# Patient Record
Sex: MALE | Race: WHITE | URBAN - METROPOLITAN AREA
[De-identification: names, ages, dates, MRNs, and addresses within clinical notes are randomized per-mention and may not be internally consistent; named-entity substitution may affect disease eponyms.]

---
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
## 2022-04-20 ENCOUNTER — APPOINTMENT (OUTPATIENT)
Dept: CT IMAGING | Age: 22
End: 2022-04-20
Payer: COMMERCIAL

## 2022-04-20 ENCOUNTER — HOSPITAL ENCOUNTER (EMERGENCY)
Age: 22
Discharge: HOME | End: 2022-04-20
Attending: EMERGENCY MEDICINE
Payer: COMMERCIAL

## 2022-04-20 VITALS
RESPIRATION RATE: 12 BRPM | TEMPERATURE: 97.8 F | OXYGEN SATURATION: 96 % | DIASTOLIC BLOOD PRESSURE: 67 MMHG | WEIGHT: 135 LBS | HEART RATE: 63 BPM | SYSTOLIC BLOOD PRESSURE: 113 MMHG | HEIGHT: 70 IN | BODY MASS INDEX: 19.33 KG/M2

## 2022-04-20 DIAGNOSIS — R82.5 POSITIVE URINE DRUG SCREEN: ICD-10-CM

## 2022-04-20 DIAGNOSIS — R10.11 RIGHT UPPER QUADRANT ABDOMINAL PAIN: Primary | ICD-10-CM

## 2022-04-20 LAB
A/G RATIO: 2.2 (ref 1.1–2.2)
ALBUMIN SERPL-MCNC: 5.6 G/DL (ref 3.4–5)
ALP BLD-CCNC: 69 U/L (ref 40–129)
ALT SERPL-CCNC: 15 U/L (ref 10–40)
AMPHETAMINE SCREEN, URINE: ABNORMAL
ANION GAP SERPL CALCULATED.3IONS-SCNC: 12 MMOL/L (ref 3–16)
AST SERPL-CCNC: 18 U/L (ref 15–37)
BARBITURATE SCREEN URINE: ABNORMAL
BASOPHILS ABSOLUTE: 0 K/UL (ref 0–0.2)
BASOPHILS RELATIVE PERCENT: 0.3 %
BENZODIAZEPINE SCREEN, URINE: ABNORMAL
BILIRUB SERPL-MCNC: 0.8 MG/DL (ref 0–1)
BILIRUBIN URINE: NEGATIVE
BLOOD, URINE: NEGATIVE
BUN BLDV-MCNC: 9 MG/DL (ref 7–20)
CALCIUM SERPL-MCNC: 10.5 MG/DL (ref 8.3–10.6)
CANNABINOID SCREEN URINE: POSITIVE
CHLORIDE BLD-SCNC: 101 MMOL/L (ref 99–110)
CLARITY: CLEAR
CO2: 26 MMOL/L (ref 21–32)
COCAINE METABOLITE SCREEN URINE: ABNORMAL
COLOR: YELLOW
CREAT SERPL-MCNC: 0.9 MG/DL (ref 0.9–1.3)
EOSINOPHILS ABSOLUTE: 0 K/UL (ref 0–0.6)
EOSINOPHILS RELATIVE PERCENT: 0.2 %
GFR AFRICAN AMERICAN: >60
GFR NON-AFRICAN AMERICAN: >60
GLUCOSE BLD-MCNC: 110 MG/DL (ref 70–99)
GLUCOSE URINE: NEGATIVE MG/DL
HCT VFR BLD CALC: 48.9 % (ref 40.5–52.5)
HEMOGLOBIN: 16.6 G/DL (ref 13.5–17.5)
KETONES, URINE: NEGATIVE MG/DL
LEUKOCYTE ESTERASE, URINE: NEGATIVE
LIPASE: 25 U/L (ref 13–60)
LYMPHOCYTES ABSOLUTE: 1.8 K/UL (ref 1–5.1)
LYMPHOCYTES RELATIVE PERCENT: 25.3 %
Lab: ABNORMAL
MCH RBC QN AUTO: 31.1 PG (ref 26–34)
MCHC RBC AUTO-ENTMCNC: 33.8 G/DL (ref 31–36)
MCV RBC AUTO: 91.8 FL (ref 80–100)
METHADONE SCREEN, URINE: ABNORMAL
MICROSCOPIC EXAMINATION: NORMAL
MONOCYTES ABSOLUTE: 0.4 K/UL (ref 0–1.3)
MONOCYTES RELATIVE PERCENT: 6.1 %
NEUTROPHILS ABSOLUTE: 4.8 K/UL (ref 1.7–7.7)
NEUTROPHILS RELATIVE PERCENT: 68.1 %
NITRITE, URINE: NEGATIVE
OPIATE SCREEN URINE: ABNORMAL
OXYCODONE URINE: ABNORMAL
PDW BLD-RTO: 12.6 % (ref 12.4–15.4)
PH UA: 6.5
PH UA: 6.5 (ref 5–8)
PHENCYCLIDINE SCREEN URINE: POSITIVE
PLATELET # BLD: 258 K/UL (ref 135–450)
PMV BLD AUTO: 7.4 FL (ref 5–10.5)
POTASSIUM REFLEX MAGNESIUM: 3.9 MMOL/L (ref 3.5–5.1)
PROPOXYPHENE SCREEN: ABNORMAL
PROTEIN UA: NEGATIVE MG/DL
RBC # BLD: 5.33 M/UL (ref 4.2–5.9)
SODIUM BLD-SCNC: 139 MMOL/L (ref 136–145)
SPECIFIC GRAVITY UA: 1.02 (ref 1–1.03)
TOTAL PROTEIN: 8.1 G/DL (ref 6.4–8.2)
URINE REFLEX TO CULTURE: NORMAL
URINE TYPE: NORMAL
UROBILINOGEN, URINE: 0.2 E.U./DL
WBC # BLD: 7.1 K/UL (ref 4–11)

## 2022-04-20 PROCEDURE — 6360000004 HC RX CONTRAST MEDICATION: Performed by: EMERGENCY MEDICINE

## 2022-04-20 PROCEDURE — 96374 THER/PROPH/DIAG INJ IV PUSH: CPT

## 2022-04-20 PROCEDURE — 74177 CT ABD & PELVIS W/CONTRAST: CPT

## 2022-04-20 PROCEDURE — 2580000003 HC RX 258: Performed by: EMERGENCY MEDICINE

## 2022-04-20 PROCEDURE — 83690 ASSAY OF LIPASE: CPT

## 2022-04-20 PROCEDURE — 80307 DRUG TEST PRSMV CHEM ANLYZR: CPT

## 2022-04-20 PROCEDURE — 99285 EMERGENCY DEPT VISIT HI MDM: CPT

## 2022-04-20 PROCEDURE — 6360000002 HC RX W HCPCS: Performed by: EMERGENCY MEDICINE

## 2022-04-20 PROCEDURE — 81003 URINALYSIS AUTO W/O SCOPE: CPT

## 2022-04-20 PROCEDURE — 80053 COMPREHEN METABOLIC PANEL: CPT

## 2022-04-20 PROCEDURE — 85025 COMPLETE CBC W/AUTO DIFF WBC: CPT

## 2022-04-20 RX ORDER — 0.9 % SODIUM CHLORIDE 0.9 %
1000 INTRAVENOUS SOLUTION INTRAVENOUS ONCE
Status: COMPLETED | OUTPATIENT
Start: 2022-04-20 | End: 2022-04-20

## 2022-04-20 RX ORDER — KETOROLAC TROMETHAMINE 30 MG/ML
15 INJECTION, SOLUTION INTRAMUSCULAR; INTRAVENOUS ONCE
Status: COMPLETED | OUTPATIENT
Start: 2022-04-20 | End: 2022-04-20

## 2022-04-20 RX ADMIN — KETOROLAC TROMETHAMINE 15 MG: 30 INJECTION, SOLUTION INTRAMUSCULAR at 14:30

## 2022-04-20 RX ADMIN — SODIUM CHLORIDE 1000 ML: 9 INJECTION, SOLUTION INTRAVENOUS at 14:29

## 2022-04-20 RX ADMIN — IOPAMIDOL 75 ML: 755 INJECTION, SOLUTION INTRAVENOUS at 15:32

## 2022-04-20 ASSESSMENT — PAIN DESCRIPTION - ORIENTATION: ORIENTATION: RIGHT;UPPER

## 2022-04-20 ASSESSMENT — PAIN SCALES - GENERAL
PAINLEVEL_OUTOF10: 5
PAINLEVEL_OUTOF10: 3

## 2022-04-20 ASSESSMENT — PAIN - FUNCTIONAL ASSESSMENT: PAIN_FUNCTIONAL_ASSESSMENT: 0-10

## 2022-04-20 ASSESSMENT — PAIN DESCRIPTION - FREQUENCY: FREQUENCY: CONTINUOUS

## 2022-04-20 ASSESSMENT — PAIN DESCRIPTION - DESCRIPTORS: DESCRIPTORS: ACHING

## 2022-04-20 ASSESSMENT — PAIN DESCRIPTION - LOCATION: LOCATION: ABDOMEN

## 2022-04-20 NOTE — Clinical Note
Mariama Brooks was seen and treated in our emergency department on 4/20/2022. He may return to work on 04/21/2022. If you have any questions or concerns, please don't hesitate to call.       Sonja Stover MD

## 2022-04-20 NOTE — ED PROVIDER NOTES
CHIEF COMPLAINT  Abdominal Pain (right flank pain since this am, \"turn funny and felt numerous pops\")      HISTORY OF PRESENT ILLNESS  Cecilia Jansen is a 25 y.o. male with a history of mood disorder, drug abuse, cholecystectomy roughly 4 years ago who presents to the ED complaining of abdominal pain and flank pain. Patient reports acute onset of right upper quadrant and right flank pain which began while taking a shower around 10:30 AM.  Pain is 6 out of 10, aching, constant. States it worsens with movement and when going over bumps in the car. Has not taken anything prior to arrival.  He denies fever, chills, nausea, vomiting, diarrhea, constipation, dysuria, hematuria, hematochezia, melena. No report of recent illness or trauma. No other complaints, modifying factors or associated symptoms. I have reviewed the following from the nursing documentation. No past medical history on file. Past Surgical History:   Procedure Laterality Date    CHOLECYSTECTOMY      TONSILLECTOMY       No family history on file.   Social History     Socioeconomic History    Marital status: Unknown     Spouse name: Not on file    Number of children: Not on file    Years of education: Not on file    Highest education level: Not on file   Occupational History    Not on file   Tobacco Use    Smoking status: Never Smoker    Smokeless tobacco: Never Used   Vaping Use    Vaping Use: Every day    Substances: Always   Substance and Sexual Activity    Alcohol use: Not Currently     Comment: holidays; occassional    Drug use: Yes     Types: Marijuana Porfirio Relic)     Comment: acid    Sexual activity: Not on file   Other Topics Concern    Not on file   Social History Narrative    Not on file     Social Determinants of Health     Financial Resource Strain:     Difficulty of Paying Living Expenses: Not on file   Food Insecurity:     Worried About Running Out of Food in the Last Year: Not on file    [address] in the Last Year: Not on file   Transportation Needs:     Lack of Transportation (Medical): Not on file    Lack of Transportation (Non-Medical): Not on file   Physical Activity:     Days of Exercise per Week: Not on file    Minutes of Exercise per Session: Not on file   Stress:     Feeling of Stress : Not on file   Social Connections:     Frequency of Communication with Friends and Family: Not on file    Frequency of Social Gatherings with Friends and Family: Not on file    Attends Jainism Services: Not on file    Active Member of [address] [place] [address] [employer] or Organizations: Not on file    Attends Club or Organization Meetings: Not on file    Marital Status: Not on file   Intimate Partner Violence:     Fear of Current or Ex-Partner: Not on file    Emotionally Abused: Not on file    Physically Abused: Not on file    Sexually Abused: Not on file   Housing Stability:     Unable to Pay for Housing in the Last Year: Not on file    Number of [place] in the Last Year: Not on file    Unstable Housing in the Last Year: Not on file     No current facility-administered medications for this encounter. Current Outpatient Medications   Medication Sig Dispense Refill    lamoTRIgine (LAMICTAL) 100 MG tablet Take 100 mg by mouth daily       No Known Allergies    REVIEW OF SYSTEMS  10 systems reviewed, pertinent positives per HPI otherwise noted to be negative. PHYSICAL EXAM  /74   Pulse 74   Temp 98.2 °F (36.8 °C)   Resp 12   Ht 5' 10\" (1.778 m)   Wt 135 lb (61.2 kg)   SpO2 100%   BMI 19.37 kg/m²   GENERAL APPEARANCE: Awake and alert. Cooperative. No acute distress. HEAD: Normocephalic. Atraumatic. EYES: PERRL. EOM's grossly intact. ENT: Mucous membranes are moist.   NECK: Supple, trachea midline. HEART: RRR. Normal S1, S2. No murmurs, rubs or gallops. LUNGS: Respirations unlabored. CTAB. Good air exchange. No wheezes, rales, or rhonchi. Speaking comfortably in full sentences. ABDOMEN: Soft. Non-distended. Mild epigastric and RUQ tenderness. No guarding or rebound. Normal Bowel sounds. EXTREMITIES: No peripheral edema. MAEE. No acute deformities. SKIN: Warm and dry. No acute rashes. NEUROLOGICAL: Alert and oriented X 3. CN II-XII intact. No gross facial drooping. Strength 5/5, sensation intact. No pronator drift. Normal coordination. PSYCHIATRIC: Normal mood and affect. LABS  I have reviewed all labs for this visit.    Results for orders placed or performed during the hospital encounter of 04/20/22   CBC with Auto Differential   Result Value Ref Range    WBC 7.1 4.0 - 11.0 K/uL    RBC 5.33 4.20 - 5.90 M/uL    Hemoglobin 16.6 13.5 - 17.5 g/dL    Hematocrit 48.9 40.5 - 52.5 %    MCV 91.8 80.0 - 100.0 fL    MCH 31.1 26.0 - 34.0 pg    MCHC 33.8 31.0 - 36.0 g/dL    RDW 12.6 12.4 - 15.4 %    Platelets 455 916 - 221 K/uL    MPV 7.4 5.0 - 10.5 fL    Neutrophils % 68.1 %    Lymphocytes % 25.3 %    Monocytes % 6.1 %    Eosinophils % 0.2 %    Basophils % 0.3 %    Neutrophils Absolute 4.8 1.7 - 7.7 K/uL    Lymphocytes Absolute 1.8 1.0 - 5.1 K/uL    Monocytes Absolute 0.4 0.0 - 1.3 K/uL    Eosinophils Absolute 0.0 0.0 - 0.6 K/uL    Basophils Absolute 0.0 0.0 - 0.2 K/uL   Comprehensive Metabolic Panel w/ Reflex to MG   Result Value Ref Range    Sodium 139 136 - 145 mmol/L    Potassium reflex Magnesium 3.9 3.5 - 5.1 mmol/L    Chloride 101 99 - 110 mmol/L    CO2 26 21 - 32 mmol/L    Anion Gap 12 3 - 16    Glucose 110 (H) 70 - 99 mg/dL    BUN 9 7 - 20 mg/dL    CREATININE 0.9 0.9 - 1.3 mg/dL    GFR Non-African American >60 >60    GFR African American >60 >60    Calcium 10.5 8.3 - 10.6 mg/dL    Total Protein 8.1 6.4 - 8.2 g/dL    Albumin 5.6 (H) 3.4 - 5.0 g/dL    Albumin/Globulin Ratio 2.2 1.1 - 2.2    Total Bilirubin 0.8 0.0 - 1.0 mg/dL    Alkaline Phosphatase 69 40 - 129 U/L    ALT 15 10 - 40 U/L    AST 18 15 - 37 U/L   Lipase   Result Value Ref Range    Lipase 25.0 13.0 - 60.0 U/L   Urine Drug Screen   Result Value Ref Range    Amphetamine Screen, Urine Neg Negative <1000ng/mL    Barbiturate Screen, Ur Neg Negative <200 ng/mL    Benzodiazepine Screen, Urine Neg Negative <200 ng/mL    Cannabinoid Scrn, Ur POSITIVE (A) Negative <50 ng/mL    Cocaine Metabolite Screen, Urine Neg Negative <300 ng/mL    Opiate Scrn, Ur Neg Negative <300 ng/mL    PCP Screen, Urine POSITIVE (A) Negative <25 ng/mL    Methadone Screen, Urine Neg Negative <300 ng/mL    Propoxyphene Scrn, Ur Neg Negative <300 ng/mL    Oxycodone Urine Neg Negative <100 ng/ml    pH, UA 6.5     Drug Screen Comment: see below    Urinalysis with Reflex to Culture   Result Value Ref Range    Color, UA Yellow Straw/Yellow    Clarity, UA Clear Clear    Glucose, Ur Negative Negative mg/dL    Bilirubin Urine Negative Negative    Ketones, Urine Negative Negative mg/dL    Specific Gravity, UA 1.025 1.005 - 1.030    Blood, Urine Negative Negative    pH, UA 6.5 5.0 - 8.0    Protein, UA Negative Negative mg/dL    Urobilinogen, Urine 0.2 <2.0 E.U./dL    Nitrite, Urine Negative Negative    Leukocyte Esterase, Urine Negative Negative    Microscopic Examination Not Indicated     Urine Type NotGiven     Urine Reflex to Culture Not Indicated          RADIOLOGY  X-RAYS:  I have reviewed radiologic plain film image(s). ALL OTHER NON-PLAIN FILM IMAGES SUCH AS CT, ULTRASOUND AND MRI HAVE BEEN READ BY THE RADIOLOGIST. CT ABDOMEN PELVIS W IV CONTRAST Additional Contrast? None   Final Result   1. Mild dilation of the proximal jejunum, suggestive of enteritis. 2. Small amount of free fluid in the cul-de-sac, nonspecific. 3. Periportal low-attenuation of the liver. This is nonspecific and may be   secondary to hydration status. Hepatitis is another consideration. Correlation with liver function tests is suggested. Rechecks: Physical assessment performed. Resting comfortably, states he is hungry. ED COURSE/MDM  Patient seen and evaluated. Old records reviewed. Labs and imaging reviewed and results discussed with patient. Patient with nonspecific right upper quadrant abdominal discomfort. Physical exam, labs, imaging unremarkable aside from some mild tenderness however patient is completely well-appearing. No fevers, leukocytosis, emesis, melena, hematochezia, urinary symptoms. Plan for close outpatient follow-up versus return with any concerns. New Prescriptions    No medications on file       CLINICAL IMPRESSION  1. Right upper quadrant abdominal pain    2. Positive urine drug screen        Blood pressure 115/74, pulse 74, temperature 98.2 °F (36.8 °C), resp. rate 12, height 5' 10\" (1.778 m), weight 135 lb (61.2 kg), SpO2 100 %. DISPOSITION  Librado Armando was discharged to home in stable condition.         Inge Lombard, MD  04/20/22 [id number]

## 2022-06-17 ENCOUNTER — APPOINTMENT (OUTPATIENT)
Dept: GENERAL RADIOLOGY | Age: 22
End: 2022-06-17
Payer: COMMERCIAL

## 2022-06-17 ENCOUNTER — HOSPITAL ENCOUNTER (EMERGENCY)
Age: 22
Discharge: HOME | End: 2022-06-17
Attending: EMERGENCY MEDICINE
Payer: COMMERCIAL

## 2022-06-17 VITALS
HEIGHT: 70 IN | TEMPERATURE: 97.9 F | DIASTOLIC BLOOD PRESSURE: 81 MMHG | BODY MASS INDEX: 18.61 KG/M2 | RESPIRATION RATE: 20 BRPM | WEIGHT: 130 LBS | OXYGEN SATURATION: 98 % | SYSTOLIC BLOOD PRESSURE: 117 MMHG | HEART RATE: 90 BPM

## 2022-06-17 DIAGNOSIS — S43.402A SPRAIN OF LEFT SHOULDER, UNSPECIFIED SHOULDER SPRAIN TYPE, INITIAL ENCOUNTER: Primary | ICD-10-CM

## 2022-06-17 PROCEDURE — 99283 EMERGENCY DEPT VISIT LOW MDM: CPT

## 2022-06-17 PROCEDURE — 73030 X-RAY EXAM OF SHOULDER: CPT

## 2022-06-17 RX ORDER — IBUPROFEN 800 MG/1
800 TABLET ORAL 3 TIMES DAILY PRN
Qty: 90 TABLET | Refills: 0 | Status: SHIPPED | OUTPATIENT
Start: 2022-06-17 | End: 2022-07-17

## 2022-06-17 RX ORDER — ACETAMINOPHEN 500 MG
500 TABLET ORAL EVERY 6 HOURS PRN
Qty: 120 TABLET | Refills: 0 | Status: SHIPPED | OUTPATIENT
Start: 2022-06-17 | End: 2022-07-17

## 2022-06-17 ASSESSMENT — PAIN DESCRIPTION - LOCATION: LOCATION: SHOULDER

## 2022-06-17 ASSESSMENT — PAIN - FUNCTIONAL ASSESSMENT: PAIN_FUNCTIONAL_ASSESSMENT: 0-10

## 2022-06-17 ASSESSMENT — PAIN DESCRIPTION - ORIENTATION: ORIENTATION: LEFT

## 2022-06-17 ASSESSMENT — PAIN SCALES - GENERAL: PAINLEVEL_OUTOF10: 8

## 2022-06-18 NOTE — ED PROVIDER NOTES
I independently performed a history and physical on [facility]. All diagnostic, treatment, and disposition decisions were made by myself in conjunction with the advanced practice provider. For further details of [facility] emergency department encounter, please see ROBERTA Celaya's documentation. Patient reports that he sustained a direct injury to the left shoulder while playing basketball. His shoulder impacted another player's hip. He felt something pop in his shoulder. He has pain with range of motion. On exam he is tender at the [facility] joint but no tenderness over the scapular body. He is neurovascular intact to the fingertips. XR SHOULDER LEFT (MIN 2 VIEWS)    Result Date: 6/17/2022  EXAMINATION: THREE XRAY VIEWS OF THE LEFT SHOULDER 6/17/2022 7:50 pm COMPARISON: None. HISTORY: ORDERING SYSTEM PROVIDED HISTORY: injury TECHNOLOGIST PROVIDED HISTORY: Reason for exam:->injury Initial encounter. FINDINGS: Linear lucency is noted within the scapular body and is of uncertain etiology. Otherwise, no acute fracture or dislocation. Joint spaces and alignment are maintained. Soft tissues are unremarkable. The visualized lung is clear. Linear lucency is noted in the scapular body on the frontal views and is of uncertain etiology. A nondisplaced fracture cannot be excluded given history of trauma. Otherwise no acute osseous abnormality. I personally saw this patient and performed a substantive portion of the visit including all aspects of the medical decision making. MDM  I do not believe that the patient has any nondisplaced fracture of the scapula. I do believe this is likely [facility] sprain/grade 1 separation. For this reason we will treat with sling and NSAIDs and follow-up with orthopedics.        Hector Barker MD  06/17/22 0751

## 2022-06-18 NOTE — ED PROVIDER NOTES
[facility] Emergency Department    CHIEF COMPLAINT  Shoulder Pain (playing basketball, ran into someone's hip \"really hard and heard a pop\"; patient reports pain 8/10)      SHARED SERVICE VISIT  I have seen and evaluated this patient with my supervising physician, Dr. Jeannie Meza. HISTORY OF PRESENT ILLNESS  Silviano Serrano is a 25 y.o. male who presents to the ED complaining of left shoulder pain. Patient states that he was playing basketball and ran his left shoulder into another player's hip and heard an audible pop. Patient states pain began shortly after collision. Patient states that moving his arm increases pain. Patient denies any numbness or tingling in the left extremity. Patient denies any headaches, dizziness, or loss of consciousness. Patient denies hitting his head. Patient denies any fevers, chills, or body aches. Patient denies any nausea or vomiting. Patient denies any coughing, sneezing coughing or sore throat. Patient denies any abdominal pain. Patient denies any urinary symptoms. Patient denies any recent sick contacts or travel. No other complaints, modifying factors or associated symptoms. Nursing notes reviewed. History reviewed. No pertinent past medical history. Past Surgical History:   Procedure Laterality Date    CHOLECYSTECTOMY      TONSILLECTOMY       History reviewed. No pertinent family history.   Social History     Socioeconomic History    Marital status: Unknown     Spouse name: Not on file    Number of children: Not on file    Years of education: Not on file    Highest education level: Not on file   Occupational History    Not on file   Tobacco Use    Smoking status: Never Smoker    Smokeless tobacco: Never Used   Vaping Use    Vaping Use: Every day    Substances: Always   Substance and Sexual Activity    Alcohol use: Not Currently     Comment: holidays; occassional    Drug use: Yes     Types: Marijuana Marybel [place]) Comment: acid    Sexual activity: Not on file   Other Topics Concern    Not on file   Social History Narrative    Not on file     Social Determinants of Health     Financial Resource Strain:     Difficulty of Paying Living Expenses: Not on file   Food Insecurity:     Worried About Running Out of Food in the Last Year: Not on file    Reilly of Food in the Last Year: Not on file   Transportation Needs:     Lack of Transportation (Medical): Not on file    Lack of Transportation (Non-Medical): Not on file   Physical Activity:     Days of Exercise per Week: Not on file    Minutes of Exercise per Session: Not on file   Stress:     Feeling of Stress : Not on file   Social Connections:     Frequency of Communication with Friends and Family: Not on file    Frequency of Social Gatherings with Friends and Family: Not on file    Attends [employer] Services: Not on file    Active Member of [address] [place] [address] [employer] or Organizations: Not on file    Attends Club or Organization Meetings: Not on file    Marital Status: Not on file   Intimate Partner Violence:     Fear of Current or Ex-Partner: Not on file    Emotionally Abused: Not on file    Physically Abused: Not on file    Sexually Abused: Not on file   Housing Stability:     Unable to Pay for Housing in the Last Year: Not on file    Number of [place] in the Last Year: Not on file    Unstable Housing in the Last Year: Not on file     No current facility-administered medications for this encounter.      Current Outpatient Medications   Medication Sig Dispense Refill    acetaminophen (TYLENOL) 500 MG tablet Take 1 tablet by mouth every 6 hours as needed for Pain 120 tablet 0    ibuprofen (ADVIL;MOTRIN) 800 MG tablet Take 1 tablet by mouth 3 times daily as needed for Pain 90 tablet 0    lamoTRIgine (LAMICTAL) 100 MG tablet Take 100 mg by mouth daily       No Known Allergies    REVIEW OF SYSTEMS  10 systems reviewed, pertinent positives per HPI otherwise noted to be negative    PHYSICAL EXAM  /81   Pulse 90   Temp 97.9 °F (36.6 °C)   Resp 20   Ht 5' 10\" (1.778 m)   Wt 130 lb (59 kg)   SpO2 98%   BMI 18.65 kg/m²   GENERAL APPEARANCE: Awake and alert. Cooperative. No acute distress. HEAD: Normocephalic. Atraumatic. EYES: PERRL. EOM's grossly intact. ENT: Mucous membranes are moist.   NECK: Supple. HEART: RRR. No murmurs. LUNGS: Respirations unlabored. CTAB. Good air exchange. Speaking comfortably in full sentences. ABDOMEN: Soft. Non-distended. Non-tender. No guarding or rebound. No masses. No organomegaly. EXTREMITIES: Tenderness to palpation of the trapezius muscle as well as AC joint. No visible or palpable deformities noted no areas of erythema or edema. Full range of motion passively with slight decrease in range of motion actively. Strength 4/5 of the left arm. No numbness or tingling radiating down the arm. Strength intact. No peripheral edema. Moves all extremities equally. All extremities neurovascularly intact. SKIN: Warm and dry. No acute rashes. NEUROLOGICAL: Alert and oriented. CN's 2-12 intact. No gross facial drooping. Strength 5/5, sensation intact. PSYCHIATRIC: Normal mood and affect. RADIOLOGY  XR SHOULDER LEFT (MIN 2 VIEWS)    Result Date: 6/17/2022  EXAMINATION: THREE XRAY VIEWS OF THE LEFT SHOULDER 6/17/2022 7:50 pm COMPARISON: None. HISTORY: ORDERING SYSTEM PROVIDED HISTORY: injury TECHNOLOGIST PROVIDED HISTORY: Reason for exam:->injury Initial encounter. FINDINGS: Linear lucency is noted within the scapular body and is of uncertain etiology. Otherwise, no acute fracture or dislocation. Joint spaces and alignment are maintained. Soft tissues are unremarkable. The visualized lung is clear. Linear lucency is noted in the scapular body on the frontal views and is of uncertain etiology. A nondisplaced fracture cannot be excluded given history of trauma. Otherwise no acute osseous abnormality.        ED COURSE  65-year-old male presents to the ED complaining of left shoulder pain. Patient suffered inclusively pain basketball when the pain began. Tenderness palpation of the [facility] joint as well as the trapezius muscle on the left arm. No palpable or visual deformities. Neurovascular status intact of the left upper extremity. Left shoulder x-ray performed in the ED shows a linear lucency in the scapular body on frontal view with uncertain otology. Nondisplaced fracture cannot be excluded given history of trauma. Patient is experiencing no pain over the scapula with no history of trauma that would fracture scapula. Triage vitals /81, pulse 90, respirations 20, temperature 97.9 °F, SPO2 98% on room air. Risk management discussed and shared decision making had with patient and/or surrogate. All questions were answered. Patient will follow up with primary care provider or orthopedics for further evaluation/treatment. All questions answered. Patient will return to ED for new/worsening symptoms. Patient was sent home with a prescription for Tylenol and Motrin as needed for pain    CRITICAL CARE TIME  0 minutes of critical care time spent not including separately billable procedures. MDM  No results found for this visit on 06/17/22. I estimate there is LOW risk for COMPARTMENT SYNDROME, DEEP VENOUS THROMBOSIS, SEPTIC ARTHRITIS, TENDON OR NEUROVASCULAR INJURY, thus I consider the discharge disposition reasonable. Woo Mosqueda and I have discussed the diagnosis and risks, and we agree with discharging home to follow-up with their primary doctor or the referral orthopedist. We also discussed returning to the Emergency Department immediately if new or worsening symptoms occur. We have discussed the symptoms which are most concerning (e.g., changing or worsening pain, numbness, weakness) that necessitate immediate return. Final Impression    1.  Sprain of left shoulder, unspecified shoulder sprain type, initial encounter        Blood pressure 117/81, pulse 90, temperature 97.9 °F (36.6 °C), resp. rate 20, height 5' 10\" (1.778 m), weight 130 lb (59 kg), SpO2 98 %. DISPOSITION  Patient was discharged to home in good condition.          Christiana Baker PA-C  06/17/22 8695

## 2022-06-18 NOTE — ED NOTES
Discharge instructions explained by ED provider. Patient verbalized understanding and denies any other concerns or complaints at this time. Patient vital signs stable and no acute signs or symptoms of distress noted at discharge. Patient deemed clinically stable. Patient d/c home with friend.      Sheryle Pepper, RN  06/17/22 4866

## 2023-08-04 ENCOUNTER — HOSPITAL ENCOUNTER (EMERGENCY)
Age: 23
Discharge: HOME | End: 2023-08-04
Attending: EMERGENCY MEDICINE

## 2023-08-04 ENCOUNTER — APPOINTMENT (OUTPATIENT)
Dept: GENERAL RADIOLOGY | Age: 23
End: 2023-08-04

## 2023-08-04 VITALS
TEMPERATURE: 98.2 F | WEIGHT: 130 LBS | BODY MASS INDEX: 18.61 KG/M2 | RESPIRATION RATE: 14 BRPM | OXYGEN SATURATION: 99 % | HEIGHT: 70 IN | DIASTOLIC BLOOD PRESSURE: 66 MMHG | SYSTOLIC BLOOD PRESSURE: 105 MMHG | HEART RATE: 49 BPM

## 2023-08-04 DIAGNOSIS — R06.02 SHORTNESS OF BREATH: ICD-10-CM

## 2023-08-04 DIAGNOSIS — R51.9 NONINTRACTABLE EPISODIC HEADACHE, UNSPECIFIED HEADACHE TYPE: Primary | ICD-10-CM

## 2023-08-04 LAB
ANION GAP SERPL CALCULATED.3IONS-SCNC: 9 MMOL/L (ref 3–16)
BASE EXCESS BLDV CALC-SCNC: 1.4 MMOL/L (ref -3–3)
BASOPHILS # BLD: 0 K/UL (ref 0–0.2)
BASOPHILS NFR BLD: 0.5 %
BUN SERPL-MCNC: 16 MG/DL (ref 7–20)
CALCIUM SERPL-MCNC: 9.9 MG/DL (ref 8.3–10.6)
CHLORIDE SERPL-SCNC: 105 MMOL/L (ref 99–110)
CO2 BLDV-SCNC: 25 MMOL/L
CO2 SERPL-SCNC: 25 MMOL/L (ref 21–32)
COHGB MFR BLDV: 2.2 % (ref 0–1.5)
CREAT SERPL-MCNC: 0.9 MG/DL (ref 0.9–1.3)
DEPRECATED RDW RBC AUTO: 12.9 % (ref 12.4–15.4)
EKG ATRIAL RATE: 50 BPM
EKG DIAGNOSIS: NORMAL
EKG P AXIS: 43 DEGREES
EKG P-R INTERVAL: 148 MS
EKG Q-T INTERVAL: 398 MS
EKG QRS DURATION: 92 MS
EKG QTC CALCULATION (BAZETT): 362 MS
EKG R AXIS: 62 DEGREES
EKG T AXIS: 52 DEGREES
EKG VENTRICULAR RATE: 50 BPM
EOSINOPHIL # BLD: 0 K/UL (ref 0–0.6)
EOSINOPHIL NFR BLD: 0.8 %
GFR SERPLBLD CREATININE-BSD FMLA CKD-EPI: >60 ML/MIN/{1.73_M2}
GLUCOSE SERPL-MCNC: 87 MG/DL (ref 70–99)
HCO3 BLDV-SCNC: 24.2 MMOL/L (ref 23–29)
HCT VFR BLD AUTO: 40 % (ref 40.5–52.5)
HGB BLD-MCNC: 13.9 G/DL (ref 13.5–17.5)
LYMPHOCYTES # BLD: 1.8 K/UL (ref 1–5.1)
LYMPHOCYTES NFR BLD: 31.5 %
MCH RBC QN AUTO: 31.1 PG (ref 26–34)
MCHC RBC AUTO-ENTMCNC: 34.7 G/DL (ref 31–36)
MCV RBC AUTO: 89.8 FL (ref 80–100)
METHGB MFR BLDV: 0.5 %
MONOCYTES # BLD: 0.4 K/UL (ref 0–1.3)
MONOCYTES NFR BLD: 7.7 %
NEUTROPHILS # BLD: 3.4 K/UL (ref 1.7–7.7)
NEUTROPHILS NFR BLD: 59.5 %
O2 THERAPY: ABNORMAL
PCO2 BLDV: 33.1 MMHG (ref 40–50)
PH BLDV: 7.48 [PH] (ref 7.35–7.45)
PLATELET # BLD AUTO: 199 K/UL (ref 135–450)
PMV BLD AUTO: 8.1 FL (ref 5–10.5)
PO2 BLDV: 127.2 MMHG (ref 25–40)
POTASSIUM SERPL-SCNC: 4.3 MMOL/L (ref 3.5–5.1)
RBC # BLD AUTO: 4.46 M/UL (ref 4.2–5.9)
SAO2 % BLDV: 98 %
SODIUM SERPL-SCNC: 139 MMOL/L (ref 136–145)
WBC # BLD AUTO: 5.8 K/UL (ref 4–11)

## 2023-08-04 PROCEDURE — 99285 EMERGENCY DEPT VISIT HI MDM: CPT

## 2023-08-04 PROCEDURE — 93005 ELECTROCARDIOGRAM TRACING: CPT | Performed by: EMERGENCY MEDICINE

## 2023-08-04 PROCEDURE — 93010 ELECTROCARDIOGRAM REPORT: CPT | Performed by: INTERNAL MEDICINE

## 2023-08-04 PROCEDURE — 71046 X-RAY EXAM CHEST 2 VIEWS: CPT

## 2023-08-04 PROCEDURE — 85025 COMPLETE CBC W/AUTO DIFF WBC: CPT

## 2023-08-04 PROCEDURE — 80048 BASIC METABOLIC PNL TOTAL CA: CPT

## 2023-08-04 PROCEDURE — 82803 BLOOD GASES ANY COMBINATION: CPT

## 2023-08-04 ASSESSMENT — PAIN DESCRIPTION - DESCRIPTORS: DESCRIPTORS: THROBBING

## 2023-08-04 ASSESSMENT — PAIN SCALES - GENERAL: PAINLEVEL_OUTOF10: 7

## 2023-08-04 ASSESSMENT — PAIN - FUNCTIONAL ASSESSMENT: PAIN_FUNCTIONAL_ASSESSMENT: 0-10

## 2023-11-02 ENCOUNTER — APPOINTMENT (OUTPATIENT)
Dept: CT IMAGING | Age: 23
DRG: 917 | End: 2023-11-02
Payer: COMMERCIAL

## 2023-11-02 ENCOUNTER — HOSPITAL ENCOUNTER (INPATIENT)
Age: 23
LOS: 6 days | Discharge: PSYCHIATRIC OR SUBSTANCE USE FACILITY | DRG: 917 | End: 2023-11-09
Attending: STUDENT IN AN ORGANIZED HEALTH CARE EDUCATION/TRAINING PROGRAM | Admitting: INTERNAL MEDICINE
Payer: COMMERCIAL

## 2023-11-02 DIAGNOSIS — T50.904A OVERDOSE OF UNDETERMINED INTENT, INITIAL ENCOUNTER: Primary | ICD-10-CM

## 2023-11-02 DIAGNOSIS — M62.82 NON-TRAUMATIC RHABDOMYOLYSIS: ICD-10-CM

## 2023-11-02 DIAGNOSIS — T14.91XA SUICIDE ATTEMPT (HCC): ICD-10-CM

## 2023-11-02 DIAGNOSIS — R41.0 DELIRIUM: ICD-10-CM

## 2023-11-02 LAB
ALBUMIN SERPL-MCNC: 5.2 G/DL (ref 3.4–5)
ALBUMIN/GLOB SERPL: 2.2 {RATIO} (ref 1.1–2.2)
ALP SERPL-CCNC: 56 U/L (ref 40–129)
ALT SERPL-CCNC: 13 U/L (ref 10–40)
AMPHETAMINES UR QL SCN>1000 NG/ML: ABNORMAL
ANION GAP SERPL CALCULATED.3IONS-SCNC: 11 MMOL/L (ref 3–16)
APAP SERPL-MCNC: <5 UG/ML (ref 10–30)
AST SERPL-CCNC: 15 U/L (ref 15–37)
BARBITURATES UR QL SCN>200 NG/ML: ABNORMAL
BASOPHILS # BLD: 0 K/UL (ref 0–0.2)
BASOPHILS NFR BLD: 0.5 %
BENZODIAZ UR QL SCN>200 NG/ML: ABNORMAL
BILIRUB SERPL-MCNC: 1 MG/DL (ref 0–1)
BUN SERPL-MCNC: 9 MG/DL (ref 7–20)
CALCIUM SERPL-MCNC: 10.3 MG/DL (ref 8.3–10.6)
CANNABINOIDS UR QL SCN>50 NG/ML: POSITIVE
CHLORIDE SERPL-SCNC: 102 MMOL/L (ref 99–110)
CO2 SERPL-SCNC: 25 MMOL/L (ref 21–32)
COCAINE UR QL SCN: POSITIVE
CREAT SERPL-MCNC: 0.6 MG/DL (ref 0.9–1.3)
DEPRECATED RDW RBC AUTO: 13.2 % (ref 12.4–15.4)
DRUG SCREEN COMMENT UR-IMP: ABNORMAL
EKG ATRIAL RATE: 107 BPM
EKG DIAGNOSIS: NORMAL
EKG P AXIS: 70 DEGREES
EKG P-R INTERVAL: 234 MS
EKG Q-T INTERVAL: 302 MS
EKG QRS DURATION: 86 MS
EKG QTC CALCULATION (BAZETT): 403 MS
EKG R AXIS: 68 DEGREES
EKG T AXIS: 69 DEGREES
EKG VENTRICULAR RATE: 107 BPM
EOSINOPHIL # BLD: 0 K/UL (ref 0–0.6)
EOSINOPHIL NFR BLD: 0.5 %
ETHANOLAMINE SERPL-MCNC: NORMAL MG/DL (ref 0–0.08)
FENTANYL SCREEN, URINE: ABNORMAL
GFR SERPLBLD CREATININE-BSD FMLA CKD-EPI: >60 ML/MIN/{1.73_M2}
GLUCOSE SERPL-MCNC: 120 MG/DL (ref 70–99)
HCT VFR BLD AUTO: 45 % (ref 40.5–52.5)
HGB BLD-MCNC: 15.3 G/DL (ref 13.5–17.5)
LYMPHOCYTES # BLD: 2 K/UL (ref 1–5.1)
LYMPHOCYTES NFR BLD: 23.1 %
MCH RBC QN AUTO: 31.2 PG (ref 26–34)
MCHC RBC AUTO-ENTMCNC: 33.9 G/DL (ref 31–36)
MCV RBC AUTO: 92 FL (ref 80–100)
METHADONE UR QL SCN>300 NG/ML: ABNORMAL
MONOCYTES # BLD: 0.6 K/UL (ref 0–1.3)
MONOCYTES NFR BLD: 7.1 %
NEUTROPHILS # BLD: 6 K/UL (ref 1.7–7.7)
NEUTROPHILS NFR BLD: 68.8 %
OPIATES UR QL SCN>300 NG/ML: ABNORMAL
OXYCODONE UR QL SCN: ABNORMAL
PCP UR QL SCN>25 NG/ML: ABNORMAL
PH UR STRIP: 6 [PH]
PLATELET # BLD AUTO: 261 K/UL (ref 135–450)
PMV BLD AUTO: 7.8 FL (ref 5–10.5)
POTASSIUM SERPL-SCNC: 3.8 MMOL/L (ref 3.5–5.1)
PROT SERPL-MCNC: 7.6 G/DL (ref 6.4–8.2)
RBC # BLD AUTO: 4.89 M/UL (ref 4.2–5.9)
SALICYLATES SERPL-MCNC: <0.3 MG/DL (ref 15–30)
SODIUM SERPL-SCNC: 138 MMOL/L (ref 136–145)
WBC # BLD AUTO: 8.7 K/UL (ref 4–11)

## 2023-11-02 PROCEDURE — 80179 DRUG ASSAY SALICYLATE: CPT

## 2023-11-02 PROCEDURE — 2580000003 HC RX 258: Performed by: STUDENT IN AN ORGANIZED HEALTH CARE EDUCATION/TRAINING PROGRAM

## 2023-11-02 PROCEDURE — 6360000002 HC RX W HCPCS

## 2023-11-02 PROCEDURE — 6360000002 HC RX W HCPCS: Performed by: STUDENT IN AN ORGANIZED HEALTH CARE EDUCATION/TRAINING PROGRAM

## 2023-11-02 PROCEDURE — 99285 EMERGENCY DEPT VISIT HI MDM: CPT

## 2023-11-02 PROCEDURE — 96361 HYDRATE IV INFUSION ADD-ON: CPT

## 2023-11-02 PROCEDURE — 82077 ASSAY SPEC XCP UR&BREATH IA: CPT

## 2023-11-02 PROCEDURE — 80307 DRUG TEST PRSMV CHEM ANLYZR: CPT

## 2023-11-02 PROCEDURE — 84450 TRANSFERASE (AST) (SGOT): CPT

## 2023-11-02 PROCEDURE — 96376 TX/PRO/DX INJ SAME DRUG ADON: CPT

## 2023-11-02 PROCEDURE — 70450 CT HEAD/BRAIN W/O DYE: CPT

## 2023-11-02 PROCEDURE — 36415 COLL VENOUS BLD VENIPUNCTURE: CPT

## 2023-11-02 PROCEDURE — 85025 COMPLETE CBC W/AUTO DIFF WBC: CPT

## 2023-11-02 PROCEDURE — 96374 THER/PROPH/DIAG INJ IV PUSH: CPT

## 2023-11-02 PROCEDURE — 93005 ELECTROCARDIOGRAM TRACING: CPT | Performed by: STUDENT IN AN ORGANIZED HEALTH CARE EDUCATION/TRAINING PROGRAM

## 2023-11-02 PROCEDURE — 93010 ELECTROCARDIOGRAM REPORT: CPT | Performed by: INTERNAL MEDICINE

## 2023-11-02 PROCEDURE — 80053 COMPREHEN METABOLIC PANEL: CPT

## 2023-11-02 PROCEDURE — 6360000002 HC RX W HCPCS: Performed by: EMERGENCY MEDICINE

## 2023-11-02 PROCEDURE — 84132 ASSAY OF SERUM POTASSIUM: CPT

## 2023-11-02 PROCEDURE — 96375 TX/PRO/DX INJ NEW DRUG ADDON: CPT

## 2023-11-02 PROCEDURE — 80143 DRUG ASSAY ACETAMINOPHEN: CPT

## 2023-11-02 RX ORDER — ONDANSETRON 2 MG/ML
INJECTION INTRAMUSCULAR; INTRAVENOUS
Status: COMPLETED
Start: 2023-11-02 | End: 2023-11-02

## 2023-11-02 RX ORDER — 0.9 % SODIUM CHLORIDE 0.9 %
1000 INTRAVENOUS SOLUTION INTRAVENOUS ONCE
Status: COMPLETED | OUTPATIENT
Start: 2023-11-02 | End: 2023-11-02

## 2023-11-02 RX ORDER — DIPHENHYDRAMINE HYDROCHLORIDE 50 MG/ML
25 INJECTION INTRAMUSCULAR; INTRAVENOUS ONCE
Status: COMPLETED | OUTPATIENT
Start: 2023-11-02 | End: 2023-11-02

## 2023-11-02 RX ORDER — OLANZAPINE 10 MG/2ML
5 INJECTION, POWDER, FOR SOLUTION INTRAMUSCULAR ONCE
Status: COMPLETED | OUTPATIENT
Start: 2023-11-02 | End: 2023-11-03

## 2023-11-02 RX ORDER — LORAZEPAM 2 MG/ML
2 INJECTION INTRAMUSCULAR ONCE
Status: COMPLETED | OUTPATIENT
Start: 2023-11-02 | End: 2023-11-02

## 2023-11-02 RX ORDER — HALOPERIDOL 5 MG/ML
2 INJECTION INTRAMUSCULAR ONCE
Status: COMPLETED | OUTPATIENT
Start: 2023-11-02 | End: 2023-11-02

## 2023-11-02 RX ORDER — WATER 10 ML/10ML
INJECTION INTRAMUSCULAR; INTRAVENOUS; SUBCUTANEOUS
Status: DISPENSED
Start: 2023-11-02 | End: 2023-11-03

## 2023-11-02 RX ORDER — ONDANSETRON 2 MG/ML
4 INJECTION INTRAMUSCULAR; INTRAVENOUS ONCE
Status: COMPLETED | OUTPATIENT
Start: 2023-11-02 | End: 2023-11-02

## 2023-11-02 RX ADMIN — ONDANSETRON 4 MG: 2 INJECTION INTRAMUSCULAR; INTRAVENOUS at 17:37

## 2023-11-02 RX ADMIN — SODIUM CHLORIDE 1000 ML: 9 INJECTION, SOLUTION INTRAVENOUS at 14:22

## 2023-11-02 RX ADMIN — DIPHENHYDRAMINE HYDROCHLORIDE 25 MG: 50 INJECTION INTRAMUSCULAR; INTRAVENOUS at 14:24

## 2023-11-02 RX ADMIN — LORAZEPAM 2 MG: 2 INJECTION INTRAMUSCULAR; INTRAVENOUS at 22:29

## 2023-11-02 RX ADMIN — HALOPERIDOL LACTATE 2 MG: 5 INJECTION, SOLUTION INTRAMUSCULAR at 14:23

## 2023-11-02 RX ADMIN — LORAZEPAM 2 MG: 2 INJECTION INTRAMUSCULAR; INTRAVENOUS at 19:26

## 2023-11-02 RX ADMIN — HALOPERIDOL LACTATE 2 MG: 5 INJECTION, SOLUTION INTRAMUSCULAR at 22:29

## 2023-11-02 RX ADMIN — SODIUM CHLORIDE 1000 ML: 9 INJECTION, SOLUTION INTRAVENOUS at 16:46

## 2023-11-02 RX ADMIN — DIPHENHYDRAMINE HYDROCHLORIDE 25 MG: 50 INJECTION INTRAMUSCULAR; INTRAVENOUS at 19:26

## 2023-11-02 RX ADMIN — LORAZEPAM 2 MG: 2 INJECTION INTRAMUSCULAR; INTRAVENOUS at 14:24

## 2023-11-02 RX ADMIN — HALOPERIDOL LACTATE 2 MG: 5 INJECTION, SOLUTION INTRAMUSCULAR at 19:26

## 2023-11-02 RX ADMIN — SODIUM CHLORIDE 1000 ML: 9 INJECTION, SOLUTION INTRAVENOUS at 14:31

## 2023-11-02 RX ADMIN — ONDANSETRON 4 MG: 2 INJECTION INTRAMUSCULAR; INTRAVENOUS at 16:45

## 2023-11-02 RX ADMIN — DIPHENHYDRAMINE HYDROCHLORIDE 25 MG: 50 INJECTION INTRAMUSCULAR; INTRAVENOUS at 22:29

## 2023-11-02 ASSESSMENT — LIFESTYLE VARIABLES
HOW OFTEN DO YOU HAVE A DRINK CONTAINING ALCOHOL: NEVER
HOW MANY STANDARD DRINKS CONTAINING ALCOHOL DO YOU HAVE ON A TYPICAL DAY: PATIENT DOES NOT DRINK

## 2023-11-02 NOTE — ED NOTES
Patient woke up and became very agitated. Patient thrashing around in bed. Patient attempting to hit his head on the padded bedrails. Security at bedside. Dr. Monster Watson notified and new orders received.      Natalia Burr RN  11/02/23 1951

## 2023-11-02 NOTE — ED NOTES
Pts mother in lobby spoke with regarding policies; unable to give medical update at this time; pt asleep and not able to consent. Mother states that he is not a known drug user; that he is having relationship issues and the police have been called multiple times to residence; CPS was called as well.      Imelda Daniels RN  11/02/23 3997

## 2023-11-02 NOTE — ED NOTES
Pt yelling in room that he was being held against his will. Stating \"I only took like 20 Lamictal; I use Xanax and fentanyl all the time, my body is built for this, I Fucked up, I'm not staying for 72hrs! \"  Pt seen twisting and pulling on hair shaking; security to bedside; provider to bedside. Provider explained procedures and policies to pt; pt eventually agreed to change into safety gown, Stating \"I am not staying past 7pm; you guys are a raping me making me change in front of you! \"  Belongings bagged labeled and placed at nurses station in secure box. Provider signed pink slip given to .        Kenroy Nguyen RN  11/02/23 5510

## 2023-11-02 NOTE — ED NOTES
Updated poison control on labs, EKG, meds given and status of patient. Irais Messer [facility], [address] [facility], [address] P[address] [facility] to [facility] , RN  11/02/23 8905

## 2023-11-02 NOTE — ED NOTES
Patient back in room from CT. Patient hooked back to monitor. Pt resting in stretcher. Sitter continues to be at bedside.      Nini Tenorio RN  11/02/23 1946

## 2023-11-02 NOTE — ED PROVIDER NOTES
[address] [address] ED     EMERGENCY DEPARTMENT ENCOUNTER            Pt Name: Sean Tejeda   MRN: [id number]   [address] [facility] 2000   Date of evaluation: 11/2/2023   Provider: Conchis Salter MD   PCP: Tuyet Richter MD   Note Started: 3:50 PM EDT 11/2/23          CHIEF COMPLAINT     Chief Complaint   Patient presents with    Drug Overdose     Patient took unknown amount of Lamictal per family the bottle was almost full    Suicidal      HISTORY OF PRESENT ILLNESS:   History from : Patient   Limitations to history : None     Sean Tejeda is a 23 y.o. male who presents complaining of Lamictal overdose. Patient states that he took approximately 20 to 30 tablets of 150 mg of Lamictal around 11:30 AM.  He states that he was having a \"episode\" but denies any attempts at self-harm. He arrives to the ER agitated. Denies homicidal ideation. He states that we are keeping him here against as well and that he needs to be able to go to work this evening or he is going to lose his apartment, job, and his child to CPS. Nursing Notes were all reviewed and agreed with, or any disagreements were addressed in the HPI. REVIEW OF SYSTEMS :    Positives and Pertinent negatives as per HPI. MEDICAL HISTORY   has a past medical history of ADHD (attention deficit hyperactivity disorder), Depression, DMDD (disruptive mood dysregulation disorder) ([address] ), and OCD (obsessive compulsive disorder).     Past Surgical History:   Procedure Laterality Date    CHOLECYSTECTOMY      GALLBLADDER SURGERY      TONSILLECTOMY      URETHRAL DILATION - FILIFORM (MALE)        CURRENTMEDICATIONS       Previous Medications    ACETAMINOPHEN (TYLENOL) 500 MG TABLET    Take 1 tablet by mouth every 6 hours as needed for Pain    IBUPROFEN (ADVIL;MOTRIN) 800 MG TABLET    Take 1 tablet by mouth 3 times daily as needed for Pain    LAMOTRIGINE (LAMICTAL PO)    Take by mouth    LAMOTRIGINE (LAMICTAL) 100 MG TABLET    Take 1

## 2023-11-02 NOTE — ED NOTES
Pts girlfriend called  not on chart unable to give medical update at this time.  Pt unable to consent      Shantel Haynes RN  11/02/23 7849       Shantel Haynes RN  11/02/23 2183

## 2023-11-02 NOTE — ED NOTES
Pt resting in bed ou closed, responds to light verbal stimuli. Resps easy and unlabored. Bedside sitter remains in place. FLAVIO Chirinos RN  11/02/23 8753

## 2023-11-03 ENCOUNTER — APPOINTMENT (OUTPATIENT)
Dept: GENERAL RADIOLOGY | Age: 23
DRG: 917 | End: 2023-11-03
Payer: COMMERCIAL

## 2023-11-03 PROBLEM — R74.8 ELEVATED CK: Status: ACTIVE | Noted: 2023-11-03

## 2023-11-03 PROBLEM — T50.904A OVERDOSE OF UNDETERMINED INTENT: Status: ACTIVE | Noted: 2023-11-03

## 2023-11-03 PROBLEM — T50.902S DRUG OVERDOSE, INTENTIONAL SELF-HARM, SEQUELA (HCC): Status: ACTIVE | Noted: 2023-11-03

## 2023-11-03 PROBLEM — G93.40 ACUTE ENCEPHALOPATHY: Status: ACTIVE | Noted: 2023-11-03

## 2023-11-03 PROBLEM — T14.91XA SUICIDE ATTEMPT (HCC): Status: ACTIVE | Noted: 2023-11-03

## 2023-11-03 PROBLEM — R06.81 APNEA: Status: ACTIVE | Noted: 2023-11-03

## 2023-11-03 LAB
ALBUMIN SERPL-MCNC: 4.8 G/DL (ref 3.4–5)
ALBUMIN/GLOB SERPL: 2.5 {RATIO} (ref 1.1–2.2)
ALP SERPL-CCNC: 50 U/L (ref 40–129)
ALT SERPL-CCNC: 12 U/L (ref 10–40)
ANION GAP SERPL CALCULATED.3IONS-SCNC: 9 MMOL/L (ref 3–16)
AST SERPL-CCNC: 19 U/L (ref 15–37)
BASE EXCESS BLDA CALC-SCNC: 0.1 MMOL/L (ref -3–3)
BASE EXCESS BLDA CALC-SCNC: 0.2 MMOL/L (ref -3–3)
BILIRUB SERPL-MCNC: 0.8 MG/DL (ref 0–1)
BUN SERPL-MCNC: 4 MG/DL (ref 7–20)
CALCIUM SERPL-MCNC: 9.9 MG/DL (ref 8.3–10.6)
CHLORIDE SERPL-SCNC: 102 MMOL/L (ref 99–110)
CK SERPL-CCNC: 382 U/L (ref 39–308)
CK SERPL-CCNC: 544 U/L (ref 39–308)
CK SERPL-CCNC: 558 U/L (ref 39–308)
CO2 BLDA-SCNC: 22.3 MMOL/L
CO2 BLDA-SCNC: 23.5 MMOL/L
CO2 SERPL-SCNC: 28 MMOL/L (ref 21–32)
COHGB MFR BLDA: 0.3 % (ref 0–1.5)
COHGB MFR BLDA: 0.3 % (ref 0–1.5)
CREAT SERPL-MCNC: 0.7 MG/DL (ref 0.9–1.3)
EKG ATRIAL RATE: 71 BPM
EKG DIAGNOSIS: NORMAL
EKG P AXIS: 71 DEGREES
EKG P-R INTERVAL: 140 MS
EKG Q-T INTERVAL: 376 MS
EKG QRS DURATION: 86 MS
EKG QTC CALCULATION (BAZETT): 408 MS
EKG R AXIS: 85 DEGREES
EKG T AXIS: 73 DEGREES
EKG VENTRICULAR RATE: 71 BPM
GFR SERPLBLD CREATININE-BSD FMLA CKD-EPI: >60 ML/MIN/{1.73_M2}
GLUCOSE BLD-MCNC: 115 MG/DL (ref 70–99)
GLUCOSE BLD-MCNC: 136 MG/DL (ref 70–99)
GLUCOSE BLD-MCNC: 71 MG/DL (ref 70–99)
GLUCOSE BLD-MCNC: 72 MG/DL (ref 70–99)
GLUCOSE BLD-MCNC: 73 MG/DL (ref 70–99)
GLUCOSE BLD-MCNC: 77 MG/DL (ref 70–99)
GLUCOSE BLD-MCNC: 81 MG/DL (ref 70–99)
GLUCOSE BLD-MCNC: 83 MG/DL (ref 70–99)
GLUCOSE BLD-MCNC: 83 MG/DL (ref 70–99)
GLUCOSE SERPL-MCNC: 100 MG/DL (ref 70–99)
HCO3 BLDA-SCNC: 21.5 MMOL/L (ref 21–29)
HCO3 BLDA-SCNC: 22.6 MMOL/L (ref 21–29)
HGB BLDA-MCNC: 13.5 G/DL (ref 13.5–17.5)
HGB BLDA-MCNC: 13.8 G/DL (ref 13.5–17.5)
LACTATE BLDV-SCNC: 3.1 MMOL/L (ref 0.4–2)
LACTATE BLDV-SCNC: 3.3 MMOL/L (ref 0.4–2)
METHGB MFR BLDA: 0.3 %
METHGB MFR BLDA: 0.3 %
O2 THERAPY: ABNORMAL
O2 THERAPY: ABNORMAL
PCO2 BLDA: 26.4 MMHG (ref 35–45)
PCO2 BLDA: 30.4 MMHG (ref 35–45)
PERFORMED ON: ABNORMAL
PERFORMED ON: ABNORMAL
PERFORMED ON: NORMAL
PH BLDA: 7.49 [PH] (ref 7.35–7.45)
PH BLDA: 7.53 [PH] (ref 7.35–7.45)
PO2 BLDA: 226.3 MMHG (ref 75–108)
PO2 BLDA: 262.4 MMHG (ref 75–108)
POTASSIUM SERPL-SCNC: 4.3 MMOL/L (ref 3.5–5.1)
PROCALCITONIN SERPL IA-MCNC: 0.04 NG/ML (ref 0–0.15)
PROT SERPL-MCNC: 6.7 G/DL (ref 6.4–8.2)
SAO2 % BLDA: 99.6 %
SAO2 % BLDA: 99.7 %
SODIUM SERPL-SCNC: 139 MMOL/L (ref 136–145)
TROPONIN, HIGH SENSITIVITY: 7 NG/L (ref 0–22)
TSH SERPL DL<=0.005 MIU/L-ACNC: 0.68 UIU/ML (ref 0.27–4.2)

## 2023-11-03 PROCEDURE — 82803 BLOOD GASES ANY COMBINATION: CPT

## 2023-11-03 PROCEDURE — 6360000002 HC RX W HCPCS

## 2023-11-03 PROCEDURE — 96376 TX/PRO/DX INJ SAME DRUG ADON: CPT

## 2023-11-03 PROCEDURE — 31500 INSERT EMERGENCY AIRWAY: CPT | Performed by: INTERNAL MEDICINE

## 2023-11-03 PROCEDURE — 84484 ASSAY OF TROPONIN QUANT: CPT

## 2023-11-03 PROCEDURE — 6360000002 HC RX W HCPCS: Performed by: INTERNAL MEDICINE

## 2023-11-03 PROCEDURE — 2580000003 HC RX 258: Performed by: EMERGENCY MEDICINE

## 2023-11-03 PROCEDURE — 2000000000 HC ICU R&B

## 2023-11-03 PROCEDURE — 99223 1ST HOSP IP/OBS HIGH 75: CPT | Performed by: INTERNAL MEDICINE

## 2023-11-03 PROCEDURE — 31500 INSERT EMERGENCY AIRWAY: CPT

## 2023-11-03 PROCEDURE — 2580000003 HC RX 258: Performed by: INTERNAL MEDICINE

## 2023-11-03 PROCEDURE — 36415 COLL VENOUS BLD VENIPUNCTURE: CPT

## 2023-11-03 PROCEDURE — 6360000002 HC RX W HCPCS: Performed by: EMERGENCY MEDICINE

## 2023-11-03 PROCEDURE — 6370000000 HC RX 637 (ALT 250 FOR IP): Performed by: INTERNAL MEDICINE

## 2023-11-03 PROCEDURE — 71045 X-RAY EXAM CHEST 1 VIEW: CPT

## 2023-11-03 PROCEDURE — 84443 ASSAY THYROID STIM HORMONE: CPT

## 2023-11-03 PROCEDURE — 80053 COMPREHEN METABOLIC PANEL: CPT

## 2023-11-03 PROCEDURE — 84145 PROCALCITONIN (PCT): CPT

## 2023-11-03 PROCEDURE — A4216 STERILE WATER/SALINE, 10 ML: HCPCS | Performed by: INTERNAL MEDICINE

## 2023-11-03 PROCEDURE — 82550 ASSAY OF CK (CPK): CPT

## 2023-11-03 PROCEDURE — 94761 N-INVAS EAR/PLS OXIMETRY MLT: CPT

## 2023-11-03 PROCEDURE — 2500000003 HC RX 250 WO HCPCS: Performed by: INTERNAL MEDICINE

## 2023-11-03 PROCEDURE — 93010 ELECTROCARDIOGRAM REPORT: CPT | Performed by: INTERNAL MEDICINE

## 2023-11-03 PROCEDURE — 0BH17EZ INSERTION OF ENDOTRACHEAL AIRWAY INTO TRACHEA, VIA NATURAL OR ARTIFICIAL OPENING: ICD-10-PCS | Performed by: INTERNAL MEDICINE

## 2023-11-03 PROCEDURE — 2700000000 HC OXYGEN THERAPY PER DAY

## 2023-11-03 PROCEDURE — 80175 DRUG SCREEN QUAN LAMOTRIGINE: CPT

## 2023-11-03 PROCEDURE — 99291 CRITICAL CARE FIRST HOUR: CPT | Performed by: INTERNAL MEDICINE

## 2023-11-03 PROCEDURE — 51702 INSERT TEMP BLADDER CATH: CPT

## 2023-11-03 PROCEDURE — 2500000003 HC RX 250 WO HCPCS: Performed by: EMERGENCY MEDICINE

## 2023-11-03 PROCEDURE — 94002 VENT MGMT INPAT INIT DAY: CPT

## 2023-11-03 PROCEDURE — 99223 1ST HOSP IP/OBS HIGH 75: CPT | Performed by: PSYCHIATRY & NEUROLOGY

## 2023-11-03 PROCEDURE — 93005 ELECTROCARDIOGRAM TRACING: CPT | Performed by: INTERNAL MEDICINE

## 2023-11-03 PROCEDURE — 96372 THER/PROPH/DIAG INJ SC/IM: CPT

## 2023-11-03 PROCEDURE — 83605 ASSAY OF LACTIC ACID: CPT

## 2023-11-03 PROCEDURE — 96374 THER/PROPH/DIAG INJ IV PUSH: CPT

## 2023-11-03 PROCEDURE — 36600 WITHDRAWAL OF ARTERIAL BLOOD: CPT

## 2023-11-03 RX ORDER — MIDAZOLAM HYDROCHLORIDE 1 MG/ML
2 INJECTION INTRAMUSCULAR; INTRAVENOUS
Status: DISCONTINUED | OUTPATIENT
Start: 2023-11-03 | End: 2023-11-07

## 2023-11-03 RX ORDER — NICOTINE 21 MG/24HR
1 PATCH, TRANSDERMAL 24 HOURS TRANSDERMAL DAILY
Status: DISCONTINUED | OUTPATIENT
Start: 2023-11-03 | End: 2023-11-09 | Stop reason: HOSPADM

## 2023-11-03 RX ORDER — FENTANYL CITRATE-0.9 % NACL/PF 10 MCG/ML
25-200 PLASTIC BAG, INJECTION (ML) INTRAVENOUS CONTINUOUS
Status: DISCONTINUED | OUTPATIENT
Start: 2023-11-03 | End: 2023-11-07

## 2023-11-03 RX ORDER — PROPOFOL 10 MG/ML
5-50 INJECTION, EMULSION INTRAVENOUS CONTINUOUS
Status: DISCONTINUED | OUTPATIENT
Start: 2023-11-03 | End: 2023-11-07

## 2023-11-03 RX ORDER — SODIUM CHLORIDE, SODIUM LACTATE, POTASSIUM CHLORIDE, CALCIUM CHLORIDE 600; 310; 30; 20 MG/100ML; MG/100ML; MG/100ML; MG/100ML
INJECTION, SOLUTION INTRAVENOUS CONTINUOUS
Status: ACTIVE | OUTPATIENT
Start: 2023-11-03 | End: 2023-11-04

## 2023-11-03 RX ORDER — CARBOXYMETHYLCELLULOSE SODIUM 10 MG/ML
1 GEL OPHTHALMIC 4 TIMES DAILY
Status: DISCONTINUED | OUTPATIENT
Start: 2023-11-03 | End: 2023-11-07

## 2023-11-03 RX ORDER — ONDANSETRON 2 MG/ML
4 INJECTION INTRAMUSCULAR; INTRAVENOUS EVERY 6 HOURS PRN
Status: DISCONTINUED | OUTPATIENT
Start: 2023-11-03 | End: 2023-11-09 | Stop reason: HOSPADM

## 2023-11-03 RX ORDER — SODIUM CHLORIDE 9 MG/ML
INJECTION, SOLUTION INTRAVENOUS PRN
Status: DISCONTINUED | OUTPATIENT
Start: 2023-11-03 | End: 2023-11-09 | Stop reason: HOSPADM

## 2023-11-03 RX ORDER — ROCURONIUM BROMIDE 10 MG/ML
70 INJECTION, SOLUTION INTRAVENOUS ONCE
Status: COMPLETED | OUTPATIENT
Start: 2023-11-03 | End: 2023-11-03

## 2023-11-03 RX ORDER — KETAMINE HYDROCHLORIDE 100 MG/ML
1 INJECTION INTRAMUSCULAR; INTRAVENOUS ONCE
Status: COMPLETED | OUTPATIENT
Start: 2023-11-03 | End: 2023-11-03

## 2023-11-03 RX ORDER — 0.9 % SODIUM CHLORIDE 0.9 %
1000 INTRAVENOUS SOLUTION INTRAVENOUS ONCE
Status: COMPLETED | OUTPATIENT
Start: 2023-11-03 | End: 2023-11-03

## 2023-11-03 RX ORDER — HALOPERIDOL 5 MG/ML
5 INJECTION INTRAMUSCULAR ONCE
Status: COMPLETED | OUTPATIENT
Start: 2023-11-03 | End: 2023-11-03

## 2023-11-03 RX ORDER — LORAZEPAM 2 MG/ML
2 INJECTION INTRAMUSCULAR ONCE
Status: COMPLETED | OUTPATIENT
Start: 2023-11-03 | End: 2023-11-03

## 2023-11-03 RX ORDER — LORAZEPAM 2 MG/ML
2 INJECTION INTRAMUSCULAR EVERY 6 HOURS PRN
Status: DISCONTINUED | OUTPATIENT
Start: 2023-11-03 | End: 2023-11-03

## 2023-11-03 RX ORDER — POTASSIUM CHLORIDE 7.45 MG/ML
10 INJECTION INTRAVENOUS PRN
Status: DISCONTINUED | OUTPATIENT
Start: 2023-11-03 | End: 2023-11-09 | Stop reason: HOSPADM

## 2023-11-03 RX ORDER — ENOXAPARIN SODIUM 100 MG/ML
40 INJECTION SUBCUTANEOUS DAILY
Status: DISCONTINUED | OUTPATIENT
Start: 2023-11-03 | End: 2023-11-09

## 2023-11-03 RX ORDER — MAGNESIUM SULFATE IN WATER 40 MG/ML
2000 INJECTION, SOLUTION INTRAVENOUS PRN
Status: DISCONTINUED | OUTPATIENT
Start: 2023-11-03 | End: 2023-11-09 | Stop reason: HOSPADM

## 2023-11-03 RX ORDER — ACETAMINOPHEN 650 MG/1
650 SUPPOSITORY RECTAL EVERY 6 HOURS PRN
Status: DISCONTINUED | OUTPATIENT
Start: 2023-11-03 | End: 2023-11-09 | Stop reason: HOSPADM

## 2023-11-03 RX ORDER — DEXTROSE MONOHYDRATE 100 MG/ML
INJECTION, SOLUTION INTRAVENOUS CONTINUOUS PRN
Status: DISCONTINUED | OUTPATIENT
Start: 2023-11-03 | End: 2023-11-09 | Stop reason: HOSPADM

## 2023-11-03 RX ORDER — PROMETHAZINE HYDROCHLORIDE 25 MG/1
12.5 TABLET ORAL EVERY 6 HOURS PRN
Status: DISCONTINUED | OUTPATIENT
Start: 2023-11-03 | End: 2023-11-09 | Stop reason: HOSPADM

## 2023-11-03 RX ORDER — MIDAZOLAM HYDROCHLORIDE 1 MG/ML
4 INJECTION INTRAMUSCULAR; INTRAVENOUS ONCE
Status: DISCONTINUED | OUTPATIENT
Start: 2023-11-03 | End: 2023-11-03

## 2023-11-03 RX ORDER — SODIUM CHLORIDE 0.9 % (FLUSH) 0.9 %
10 SYRINGE (ML) INJECTION EVERY 12 HOURS SCHEDULED
Status: DISCONTINUED | OUTPATIENT
Start: 2023-11-03 | End: 2023-11-09 | Stop reason: HOSPADM

## 2023-11-03 RX ORDER — ACETAMINOPHEN 325 MG/1
650 TABLET ORAL EVERY 6 HOURS PRN
Status: DISCONTINUED | OUTPATIENT
Start: 2023-11-03 | End: 2023-11-09 | Stop reason: HOSPADM

## 2023-11-03 RX ORDER — ETOMIDATE 2 MG/ML
10 INJECTION INTRAVENOUS ONCE
Status: COMPLETED | OUTPATIENT
Start: 2023-11-03 | End: 2023-11-03

## 2023-11-03 RX ORDER — DEXTROSE MONOHYDRATE 25 G/50ML
25 INJECTION, SOLUTION INTRAVENOUS PRN
Status: DISCONTINUED | OUTPATIENT
Start: 2023-11-03 | End: 2023-11-06

## 2023-11-03 RX ORDER — PROPOFOL 10 MG/ML
INJECTION, EMULSION INTRAVENOUS
Status: COMPLETED
Start: 2023-11-03 | End: 2023-11-03

## 2023-11-03 RX ORDER — DEXMEDETOMIDINE HYDROCHLORIDE 4 UG/ML
.1-1.5 INJECTION, SOLUTION INTRAVENOUS CONTINUOUS
Status: DISCONTINUED | OUTPATIENT
Start: 2023-11-03 | End: 2023-11-03

## 2023-11-03 RX ORDER — FENTANYL CITRATE 50 UG/ML
50 INJECTION, SOLUTION INTRAMUSCULAR; INTRAVENOUS
Status: DISCONTINUED | OUTPATIENT
Start: 2023-11-03 | End: 2023-11-09

## 2023-11-03 RX ORDER — SODIUM CHLORIDE 0.9 % (FLUSH) 0.9 %
10 SYRINGE (ML) INJECTION PRN
Status: DISCONTINUED | OUTPATIENT
Start: 2023-11-03 | End: 2023-11-09 | Stop reason: HOSPADM

## 2023-11-03 RX ORDER — MIDAZOLAM HYDROCHLORIDE 5 MG/ML
INJECTION INTRAMUSCULAR; INTRAVENOUS
Status: COMPLETED
Start: 2023-11-03 | End: 2023-11-03

## 2023-11-03 RX ORDER — ETOMIDATE 2 MG/ML
20 INJECTION INTRAVENOUS ONCE
Status: COMPLETED | OUTPATIENT
Start: 2023-11-03 | End: 2023-11-03

## 2023-11-03 RX ORDER — 0.9 % SODIUM CHLORIDE 0.9 %
30 INTRAVENOUS SOLUTION INTRAVENOUS ONCE
Status: COMPLETED | OUTPATIENT
Start: 2023-11-03 | End: 2023-11-03

## 2023-11-03 RX ADMIN — CARBOXYMETHYLCELLULOSE SODIUM 1 DROP: 10 GEL OPHTHALMIC at 20:15

## 2023-11-03 RX ADMIN — KETAMINE HYDROCHLORIDE 70 MG: 100 INJECTION, SOLUTION, CONCENTRATE INTRAMUSCULAR; INTRAVENOUS at 06:37

## 2023-11-03 RX ADMIN — ETOMIDATE INJECTION 20 MG: 2 SOLUTION INTRAVENOUS at 09:27

## 2023-11-03 RX ADMIN — SODIUM CHLORIDE, POTASSIUM CHLORIDE, SODIUM LACTATE AND CALCIUM CHLORIDE: 600; 310; 30; 20 INJECTION, SOLUTION INTRAVENOUS at 20:14

## 2023-11-03 RX ADMIN — LORAZEPAM 2 MG: 2 INJECTION INTRAMUSCULAR; INTRAVENOUS at 05:17

## 2023-11-03 RX ADMIN — ROCURONIUM BROMIDE 70 MG: 10 INJECTION, SOLUTION INTRAVENOUS at 09:27

## 2023-11-03 RX ADMIN — DEXTROSE MONOHYDRATE 125 ML: 100 INJECTION, SOLUTION INTRAVENOUS at 22:49

## 2023-11-03 RX ADMIN — SODIUM CHLORIDE, POTASSIUM CHLORIDE, SODIUM LACTATE AND CALCIUM CHLORIDE: 600; 310; 30; 20 INJECTION, SOLUTION INTRAVENOUS at 10:42

## 2023-11-03 RX ADMIN — ETOMIDATE INJECTION 10 MG: 2 SOLUTION INTRAVENOUS at 09:26

## 2023-11-03 RX ADMIN — HALOPERIDOL LACTATE 5 MG: 5 INJECTION, SOLUTION INTRAMUSCULAR at 05:29

## 2023-11-03 RX ADMIN — SODIUM CHLORIDE 1974 ML: 9 INJECTION, SOLUTION INTRAVENOUS at 05:36

## 2023-11-03 RX ADMIN — PROPOFOL 50 MCG/KG/MIN: 10 INJECTION, EMULSION INTRAVENOUS at 14:47

## 2023-11-03 RX ADMIN — FAMOTIDINE 20 MG: 10 INJECTION, SOLUTION INTRAVENOUS at 12:15

## 2023-11-03 RX ADMIN — OLANZAPINE 5 MG: 10 INJECTION, POWDER, FOR SOLUTION INTRAMUSCULAR at 00:03

## 2023-11-03 RX ADMIN — ENOXAPARIN SODIUM 40 MG: 100 INJECTION SUBCUTANEOUS at 12:15

## 2023-11-03 RX ADMIN — DEXTROSE MONOHYDRATE 125 ML: 100 INJECTION, SOLUTION INTRAVENOUS at 20:14

## 2023-11-03 RX ADMIN — LORAZEPAM 2 MG: 2 INJECTION INTRAMUSCULAR; INTRAVENOUS at 06:29

## 2023-11-03 RX ADMIN — PROPOFOL 30 MCG/KG/MIN: 10 INJECTION, EMULSION INTRAVENOUS at 20:33

## 2023-11-03 RX ADMIN — MUPIROCIN: 20 OINTMENT TOPICAL at 12:15

## 2023-11-03 RX ADMIN — PROPOFOL 50 MCG/KG/MIN: 10 INJECTION, EMULSION INTRAVENOUS at 12:03

## 2023-11-03 RX ADMIN — FENTANYL CITRATE 50 MCG: 50 INJECTION, SOLUTION INTRAMUSCULAR; INTRAVENOUS at 10:49

## 2023-11-03 RX ADMIN — FAMOTIDINE 20 MG: 10 INJECTION, SOLUTION INTRAVENOUS at 20:15

## 2023-11-03 RX ADMIN — SODIUM CHLORIDE, PRESERVATIVE FREE 10 ML: 5 INJECTION INTRAVENOUS at 20:16

## 2023-11-03 RX ADMIN — MIDAZOLAM HYDROCHLORIDE 2 MG: 1 INJECTION, SOLUTION INTRAMUSCULAR; INTRAVENOUS at 10:40

## 2023-11-03 RX ADMIN — MIDAZOLAM 4 MG: 5 INJECTION INTRAMUSCULAR; INTRAVENOUS at 09:28

## 2023-11-03 RX ADMIN — MIDAZOLAM HYDROCHLORIDE 2 MG: 1 INJECTION, SOLUTION INTRAMUSCULAR; INTRAVENOUS at 18:33

## 2023-11-03 RX ADMIN — SODIUM CHLORIDE 1000 ML: 9 INJECTION, SOLUTION INTRAVENOUS at 17:50

## 2023-11-03 RX ADMIN — Medication 50 MCG/HR: at 11:56

## 2023-11-03 RX ADMIN — MUPIROCIN: 20 OINTMENT TOPICAL at 20:15

## 2023-11-03 RX ADMIN — CARBOXYMETHYLCELLULOSE SODIUM 1 DROP: 10 GEL OPHTHALMIC at 12:16

## 2023-11-03 SDOH — ECONOMIC STABILITY: TRANSPORTATION INSECURITY
IN THE PAST 12 MONTHS, HAS LACK OF TRANSPORTATION KEPT YOU FROM MEETINGS, WORK, OR FROM GETTING THINGS NEEDED FOR DAILY LIVING?: YES

## 2023-11-03 SDOH — ECONOMIC STABILITY: TRANSPORTATION INSECURITY
IN THE PAST 12 MONTHS, HAS THE LACK OF TRANSPORTATION KEPT YOU FROM MEDICAL APPOINTMENTS OR FROM GETTING MEDICATIONS?: YES

## 2023-11-03 ASSESSMENT — PULMONARY FUNCTION TESTS
PIF_VALUE: 14
PIF_VALUE: 15
PIF_VALUE: 15
PIF_VALUE: 18
PIF_VALUE: 14

## 2023-11-03 ASSESSMENT — PAIN SCALES - GENERAL
PAINLEVEL_OUTOF10: 0
PAINLEVEL_OUTOF10: 0

## 2023-11-03 NOTE — PROGRESS NOTES
4 Eyes Skin Assessment     NAME:  Sean Tejeda  YOB: 2000  MEDICAL RECORD NUMBER:  [id number]    The patient is being assessed for  Admission    I agree that at least one RN has performed a thorough Head to Toe Skin Assessment on the patient. ALL assessment sites listed below have been assessed. Areas assessed by both nurses:    Head, Face, Ears, Shoulders, Back, Chest, Arms, Elbows, Hands, Sacrum. Buttock, Coccyx, Ischium, Legs. Feet and Heels, and Under Medical Devices         Does the Patient have a Wound?  No noted wound(s)       Juwan Prevention initiated by RN: Yes  Wound Care Orders initiated by RN: No    Pressure Injury (Stage 3,4, Unstageable, DTI, NWPT, and Complex wounds) if present, place Wound referral order by RN under : No    New Ostomies, if present place, Ostomy referral order under : No     Nurse 1 eSignature: Electronically signed by Keyon León RN on 11/3/23 at 6:48 PM EDT    **SHARE this note so that the co-signing nurse can place an eSignature**    Nurse 2 eSignature: {Esignature:[id number]}

## 2023-11-03 NOTE — ED PROVIDER NOTES
Patient presents with the above complaints and was signed out to me by the prior physician for final disposition. I also examined the patient. Labs Reviewed   COMPREHENSIVE METABOLIC PANEL W/ REFLEX TO MG FOR LOW K - Abnormal; Notable for the following components:       Result Value    Glucose 120 (*)     Creatinine 0.6 (*)     Albumin 5.2 (*)     All other components within normal limits   ACETAMINOPHEN LEVEL - Abnormal; Notable for the following components:    Acetaminophen Level <5 (*)     All other components within normal limits   SALICYLATE LEVEL - Abnormal; Notable for the following components:    Salicylate, Serum <0.9 (*)     All other components within normal limits   URINE DRUG SCREEN - Abnormal; Notable for the following components:    Cannabinoid Scrn, Ur POSITIVE (*)     Cocaine Metabolite Screen, Urine POSITIVE (*)     All other components within normal limits   CK - Abnormal; Notable for the following components: Total  (*)     All other components within normal limits   COMPREHENSIVE METABOLIC PANEL W/ REFLEX TO MG FOR LOW K - Abnormal; Notable for the following components:    Glucose 100 (*)     BUN 4 (*)     Creatinine 0.7 (*)     Albumin/Globulin Ratio 2.5 (*)     All other components within normal limits   LACTIC ACID - Abnormal; Notable for the following components:    Lactic Acid 3.1 (*)     All other components within normal limits   LACTIC ACID - Abnormal; Notable for the following components:    Lactic Acid 3.3 (*)     All other components within normal limits   CK - Abnormal; Notable for the following components: Total  (*)     All other components within normal limits   CBC WITH AUTO DIFFERENTIAL   ETHANOL   POTASSIUM W/ REFLEX TO MAGNESIUM   AST   TSH WITH REFLEX        CT HEAD WO CONTRAST   Final Result   No acute intracranial abnormality. ED Course as of 11/03/23 0659   Fri Nov 03, 2023   0100 Attempting to come out of the bed and remains confused.

## 2023-11-03 NOTE — PROGRESS NOTES
Transfer of care report given to [facility]. Dr Trina Hernandez, to bedside. Prepare to intubate. Orders to follow. Mother- Halima Sing and updated on patient status and POC by receiving RN. Care transferred.

## 2023-11-03 NOTE — ED NOTES
Patient sleeping more peacefully in cart. Respirations easy. Sitter continue to be at bedside.      Nikki Zimmer RN  11/03/23 0005

## 2023-11-03 NOTE — PROGRESS NOTES
Call to micro lab who states that lamotrigine level is a send out to [place], [place] and results will be in Monday/ Tuesday most likely if sample was sent out today.

## 2023-11-03 NOTE — ED NOTES
Patient aggressive in stretcher and attempting to get out of bed. Not following commands. Hitting head on padded bed rails. Dr. Rena Naik aware.      Erica Matias RN  11/03/23 7936

## 2023-11-03 NOTE — ED NOTES
[address] [facility] sent to Dr. Joanen German for admission      Edwyna Current  11/03/23 7913

## 2023-11-03 NOTE — PROGRESS NOTES
Heated air blanket added to pt. See VS. Axillary temp is stable. Pt having heart rate currently at 55. Propofol is at 30, fentanyl is off. Mother and grandmother at bedside. Pt less responsive to inline suction at this time, but does still cough.

## 2023-11-03 NOTE — ED NOTES
Patient incontinent of urine. Depends and linens changed. Pt thrashing in stretcher. Patient swinging arms. Requiring multiple staff to hold patient in bed. Dr. Bhat Lipoma aware.      Rita Krueger RN  11/03/23 2339

## 2023-11-03 NOTE — PROGRESS NOTES
Pt intubated to protect his airway. 7.0 ETT, 24 lip. AC14/440/50%/+5. Equal breath sounds heard, color change detected post intubation.

## 2023-11-03 NOTE — PROGRESS NOTES
11/03/23 1421   Encounter Summary   Encounter Overview/Reason  Initial Encounter   Service Provided For: Family   Referral/Consult From: [address] Family members   Last Encounter  11/03/23  ( visited with family members; provided pastoral support and encouragement)

## 2023-11-03 NOTE — PROGRESS NOTES
Long discussion with pt father . States that pt and girlfriend were arguing and that the pt took medications. Father states that info will only be given to himself and wife. Girlfriend already made aware of this earlier. Father updated on labs and also on vent changes.

## 2023-11-03 NOTE — SIGNIFICANT EVENT
Noted CK and lactic increased. Bolus orderred      Patient received multiple excessive amounts of medications to chemically restrain the patient in the ED. Per ,patient remains delirious and fighting hurting staff. Requested patient will need ICU      Will place orders to ICU.

## 2023-11-03 NOTE — ED PROVIDER NOTES
Patient was signed out to me pending medical clearance. After patient woke up from prior sedation, patient again was belligerent, requiring repeat chemical sedation with Haldol, Benadryl and Ativan. Patient was noted to have fallen out of bed earlier in the shift. There is no reported head injury. While patient was sedated, CT head was obtained which shows no acute intracranial bleed. Patient's will require reevaluation after he is not sedated for psychiatric evaluation and management.        Jluis Valentine MD  11/02/23 2017

## 2023-11-03 NOTE — PROGRESS NOTES
Spoke with Dr Carlyle Hudson regarding current sedation and vital signs. Pt bp 86/61 (70), pulse 52, warming blanket on, oral temp 97.4. Remains compliant on vent and sat 99%. Vent settings AC 14 rate, 400tv, 30%, 5peep. Mother and grandmother remain in room pacing at this time.

## 2023-11-03 NOTE — CONSULTS
lethrgic    Psych:cannot assess            LABS/IMAGING:    CBC:  Lab Results   Component Value Date    WBC 8.7 11/02/2023    HGB 15.3 11/02/2023    HCT 45.0 11/02/2023    MCV 92.0 11/02/2023     11/02/2023    LYMPHOPCT 23.1 11/02/2023    RBC 4.89 11/02/2023    MCH 31.2 11/02/2023    MCHC 33.9 11/02/2023    RDW 13.2 11/02/2023    NEUTOPHILPCT 68.8 11/02/2023    MONOPCT 7.1 11/02/2023    BASOPCT 0.5 11/02/2023    NEUTROABS 6.0 11/02/2023    LYMPHSABS 2.0 11/02/2023    MONOSABS 0.6 11/02/2023    EOSABS 0.0 11/02/2023    BASOSABS 0.0 11/02/2023       Recent Labs     11/02/23  1408   WBC 8.7   HGB 15.3   HCT 45.0   MCV 92.0          BMP:   Recent Labs     11/02/23  1408 11/02/23  1451 11/03/23  0306     --  139   K  --  3.8 4.3     --  102   CO2 25  --  28   BUN 9  --  4*   CREATININE 0.6*  --  0.7*       MG:   Lab Results   Component Value Date/Time    MG 1.80 03/19/2020 03:55 AM     Ca/Phos:   Lab Results   Component Value Date    CALCIUM 9.9 11/03/2023     LIVER PROFILE:   Recent Labs     11/02/23  1408 11/02/23  1451 11/03/23  0306   AST  --  15 19   ALT 13  --  12   BILITOT 1.0  --  0.8   ALKPHOS 56  --  50       PT/INR: No results for input(s): \"PROTIME\", \"INR\" in the last 72 hours. APTT: No results for input(s): \"APTT\" in the last 72 hours. Cultures:      Films:  CXR reviewed by me and it showed no acute proces      Invasive Lines:peripheral lines   No acute intracranial abnormality.   Assessment:       Acute encephalopathy secondary drug overdose-  -lactic acidosis   -Lamictal overdose   -BiPoalr   -      Plan:      *-/16/45/5   *-sedated with propofol ,added fentanyl as become agitated   *-CT head ok   *-3 IV access IVF   *- NG   *_will get EKG   *- poison control center contacted  *-GI/DVT  *- ringer lactate will give boluses as needed  - hold on tube feeding   *-neurology following   Trend CK  Repeat EKG   Discuss with mother and father in details ,all questions were answered and explain course,pln of care ,neurology evaluation ,     Care reviewed with nursing staff, medical and surgical specialty care, primary care and the patient's family as available. Chart review/lab review/X-ray viewing/documentation and had long Conversation with patient/family re: prognosis, care options and any end of life issues:      Critical care time spent reviewing labs/films, examining patient, collaborating with other physicians more than 35  Minutes  excluding procedures . Jayme Vasquez M.D.   11/3/2023  8:43 PM    Thank you very much for allowing me to participate in the care of this pleasant patient , should you have any questions ,please do not hesitate to contact me      Elisa Kendall MD,[facility]  Pulmonary&Critical Care Medicine    Lizzy Bhat    NOTE: This report was transcribed using voice recognition software. Every effort was made to ensure accuracy; however, inadvertent computerized transcription errors may be present.

## 2023-11-03 NOTE — H&P
Hospital Medicine History & Physical      PCP: Cesar Blanchard MD    Date of Admission: 11/2/2023    Date of Service: Pt seen/examined on 11/3/2023     Chief Complaint:    Chief Complaint   Patient presents with    Drug Overdose     Patient took unknown amount of Lamictal per family the bottle was almost full    Suicidal         History Of Present Illness: The patient is a 23 y.o. male with pmhx of depression, ADHD who presented to [facility] ED after intentional drug overdose. Patient took approximately 20-30 tablets of 150 mg of lamictal around 11:30 am yesterday    Poison control was contacted and recommended monitoring. After ER reassessment with in few hrs,  Patient was extremely agitated and aggressive needing IV sedatives frequently. In the last 12 hrs he received 2 doses of IV ketamine for severe agitation, IV haldol x 11 mg and several doses of ativan. Remains confused and restless needing ICU admission this am    Upon arrival to ICU , pt noted to be restless and periodic  apneic spells with elevated endtidal co2 and eventually got placed on vent for impending resp failure    Ct head neg in ER     Past Medical History:        Diagnosis Date    ADHD (attention deficit hyperactivity disorder)     Depression     DMDD (disruptive mood dysregulation disorder) ([facility])     OCD (obsessive compulsive disorder)        Past Surgical History:        Procedure Laterality Date    CHOLECYSTECTOMY      GALLBLADDER SURGERY      TONSILLECTOMY      URETHRAL DILATION - FILIFORM (MALE)         Medications Prior to Admission:    Prior to Admission medications    Medication Sig Start Date End Date Taking?  Authorizing Provider   acetaminophen (TYLENOL) 500 MG tablet Take 1 tablet by mouth every 6 hours as needed for Pain 6/17/22 7/17/22  Greyson Thomas PA-C   ibuprofen (ADVIL;MOTRIN) 800 MG tablet Take 1 tablet by mouth 3 times daily as needed for Pain 6/17/22 7/17/22  Greyson Thomas PA-C   lamoTRIgine (LAMICTAL) 100

## 2023-11-03 NOTE — PROGRESS NOTES
Critical Care consult called to answering service, Electronically signed by Christal Mann on 11/3/2023 at 8:03 AM    Psychiatry consult placed on list of on call, Electronically signed by Christal Mann on 11/3/2023 at 8:09 AM    Neurology consult- unable to complete, no coverage after 8am on Fridays.  Electronically signed by Christal Mann on 11/3/2023 at 8:11 AM

## 2023-11-03 NOTE — ED NOTES
Patient rolling around in stretcher. Patient moaning but not making words. Pt was incontinent of urine. Attends and linens changed. Dr. Wendy Moody aware.       Judit Thompson RN  11/03/23 9268

## 2023-11-03 NOTE — CONSULTS
Neurology consultation note    Patient name: Anika Llamas      Chief Complaint:  Encephalopathy. History of present illness: This is a 23years old male who was brought to the ER yesterday due to possible lamotrigine overdose. The patient was found significantly agitated after he took approximately 20 to 30 tablets of 150 mg of lamotrigine around noon yesterday. Overnight, the patient developed significant aggressive behavior. The patient required restraints and multiple medications. Then the patient became less responsive. There was no seizure-like activity reported. But the patient has significant elevated CK and lactic acid. The patient has no history of seizure disorder. But the patient is currently on lamotrigine for mood disorder.     Past medical history:    Past Medical History:   Diagnosis Date    ADHD (attention deficit hyperactivity disorder)     Depression     DMDD (disruptive mood dysregulation disorder) ([facility])     OCD (obsessive compulsive disorder)        Past surgical history:    Past Surgical History:   Procedure Laterality Date    CHOLECYSTECTOMY      GALLBLADDER SURGERY      TONSILLECTOMY      URETHRAL DILATION - FILIFORM (MALE)          Medication:    Current Facility-Administered Medications   Medication Dose Route Frequency Provider Last Rate Last Admin    LORazepam (ATIVAN) injection 2 mg  2 mg IntraVENous Q6H PRN Radah Johnson MD   2 mg at 11/03/23 0517    sodium chloride flush 0.9 % injection 10 mL  10 mL IntraVENous 2 times per day Scott, Ahmad A, DO        sodium chloride flush 0.9 % injection 10 mL  10 mL IntraVENous PRN Scott, Ahmad A, DO        0.9 % sodium chloride infusion   IntraVENous PRN Scott, Ahmad A, DO        potassium chloride 10 mEq/100 mL IVPB (Peripheral Line)  10 mEq IntraVENous PRN Scott, Ahmad A, DO        magnesium sulfate 2000 mg in 50 mL IVPB premix  2,000 mg IntraVENous PRN Scott, Ahmad A, DO        promethazine (PHENERGAN) tablet 12.5 mg  12.5 legs.  There is no synchronize movement to suspicious of seizure activity. Imaging, procedure, and laboratory data:   I reviewed the brain imagings and labs. Assessment:    Principal Problem:    Acute encephalopathy  Resolved Problems:    * No resolved hospital problems. *     The pattern of abnormal movement is not typical for epileptic event. CT brain showed no acute injury. UDS is positive for cannabinoids and cocaine. From neurology perspective, this is likely toxic/metabolic event. The patient required intubation due to encephalopathy. Plan:    1. Maintain propofol at least 24 hours. 2.  Trending CK. 3.  Maintain hydration. 4.  If the patient has no significant improvement after weaning propofol, neurology plan to do EEG. 5.  Lamotrigine level. 75 minutes were spent with the patient with greater than 50% of the time spent in counseling and coordination of care.     David Choudhary MD

## 2023-11-03 NOTE — PROGRESS NOTES
Bedside report received from [address] Pt arrived to ICU 14, unable to follow commands laying on L side. Pt \"moaning\". Unable to determine orientation. Sitter at bedside. Respirations irregular. SpO2 97%. HR- 80s. Sinus Rhythm on the monitor. Admission questions/ assessment to follow. 8:15 AM    Dr Andrea Ortega to Bedside- concern noted for patients mentation and agonal/ apneic respirations. Orders to follow. 8:25 AM    Pt became agitated while Dr Andrea Ortega at bedside. Verbal order for Precedex and Bilat wrist restraint. 8:30 AM    Per Discussion with Dr Andrea Ortega- verbal order to place CO2 monitoring. Perfect Serve to Dr Andrea Ortega- CO2 45-56. Pt placed on 2L. Page to Dr Aleah Greene.

## 2023-11-03 NOTE — PROGRESS NOTES
VSS, pt coughing and fighting vent. PRN versed given and propofol increased. Mother remains in room.

## 2023-11-03 NOTE — SIGNIFICANT EVENT
Paged to admit for patient LOS, and delirium from intentional overdose. Poison control is already consulted and recommended observation. On chart review by two prior ED physician that this patient will need psych placement. Requested UA, Lactic, CK and consult to psych. If Patient needs medical clearance to consult medicine at 7am for evaluation if patient will need to be admitted.

## 2023-11-03 NOTE — PROGRESS NOTES
Physical Therapy / Occupational Therapy    Noted PT and OT orders from Dr. Lorena Suero. Per chart review patient has been aggressive and combative with ED staff. Plans are for placement. Will discontinue orders for now. Please re-order when admitted and appropriate to participate in therapy.      James Christian, PT, DPT

## 2023-11-03 NOTE — PROGRESS NOTES
Poison control ( [facility]) called and updated. They state that pt should have metabolized Lamictal at this point. Pierre Bender also states that pt would likely metabolize everything in next 24 hours but that there were no other recommendations at this time. This RN asking if Lamictal level not resulting until Monday or later had any significance in pt care. She states that that level had no significance in care of plan of care and that they do not even recommend testing level. She states that they do not usually (as pharmacists) recommend routinely monitor this level for treatment also in the average case. Parents updated that Lamictal level had no input of treatment and that lab would be only for neurologist to review later.

## 2023-11-03 NOTE — ED NOTES
Patient crying out in stretcher. Patient rolling around in stretcher aggressively. Patient throwing legs over bed rails. Dr. Bhat Lipoma aware.      Rita Krueger RN  11/03/23 1549

## 2023-11-03 NOTE — PROGRESS NOTES
Dr Pato Shaffer and Dr Rachael Henley at bedside. 134/86  P131      Order for rapid sequence intubation obtained. Patient pre-oxygenated to with BVM to a saturation 100 %. 4 mg of Versed ordered and administered at 9:16 AM and flush. 20 mg of Etomidate ordered and administered at 9:16 AM and flush 10 mg more given 9:19 AM.       70 mg of Rocuronium ordered and administered at 9:19 AM  hours    9:20 /97  Recent Labs     11/02/23  1408 11/02/23  1451 11/03/23  0306     --  139   K  --  3.8 4.3   BUN 9  --  4*   CREATININE 0.6*  --  0.7*          Dr Rachael Henley  inserted a number  7 ET tube. The ET tube is secured at 24 cm measured at the patients lip line. Breath sounds are equal bilaterally. There is a positive color change noted in the colorimetric CO2 detector. RT connected the patient to a ventilator. See orders for ventilator orders. OG tube inserted to a depth of 55 cm. Ausculation of air bolus is positive. Aspirated stomach contents are green. POST Intubation ORDERS    ABG ordered in 2   hours  Portable Chest x-ray ordered to confirm placement of the patients ET tube and gastric tube. Bilateral wrist restraints ordered and initiated. Pulses present distal to restraints. Propofol drip ordered for sedation. See EMAR and orders. See physician note to follow.  Electronically signed by Frieda Shanks RN on 11/3/2023 at 9:14 AM

## 2023-11-03 NOTE — CARE COORDINATION
11/03/23 1526   Service Assessment   Patient Orientation   (Pt is intubated. Spoke with mother of patient.)   Cognition   (Pt is intubated. Spoke with mother of patient.)   Primary Caregiver Self   Support Systems Parent; Family Members;Spouse/Significant Other   Patient's [address] is: Legal Next of [address] [place] [address]   PCP Verified by CM Yes   Last Visit to PCP Within last 6 months   Prior Functional Level Independent in ADLs/IADLs   Current Functional Level Independent in ADLs/IADLs   Can patient return to prior living arrangement Unknown at present   Ability to make needs known: Good  (At baseline)   Family able to assist with home care needs: Yes   Would you like for me to discuss the discharge plan with any other family members/significant others, and if so, who? No   Financial Resources Other (Comment)  (private insurance)   Community Resources None   Discharge Planning   Type of Residence Apartment   Living Arrangements Spouse/Significant Other   Current Services Prior To Admission None   Potential Assistance Needed N/A   DME Ordered? No   Potential Assistance Purchasing Medications No   Type of Home Care Services None   Patient expects to be discharged to: Unknown   Follow Up Appointment: Best Day/Time    (mother makes appointments)   One/Two Story Residence Other (comment)  (apartment - unknown what floor)   History of falls? 0     Case Management Assessment  Initial Evaluation    Date/Time of Evaluation: 11/3/2023 3:32 PM  Assessment Completed by: Radha Bernard RN    If patient is discharged prior to next notation, then this note serves as note for discharge by case management.     Patient Name: Tej Hess                   YOB: 2000  Diagnosis: Delirium [R41.0]  Suicide attempt [facility]  Acute encephalopathy [G93.40]  Non-traumatic rhabdomyolysis [M62.82]  Overdose of undetermined intent, initial encounter [T50.525V]  Drug overdose, intentional self-harm, sequela ([address]) [T50.902S]                   Date / Time: 11/2/2023  1:51 PM    Patient Admission Status: Inpatient   Readmission Risk (Low < 19, Mod (19-27), High > 27): Readmission Risk Score: 8.5    Current PCP: Margie Moffett MD  PCP verified by CM? (P) Yes    Chart Reviewed: Yes      History Provided by:    Patient Orientation: (P)  (Pt is intubated. Spoke with mother of patient.)    Patient Cognition: (P)  (Pt is intubated. Spoke with mother of patient.)    Hospitalization in the last 30 days (Readmission):  No    If yes, Readmission Assessment in  Navigator will be completed.     Advance Directives:      Code Status: Full Code   Patient's Primary Decision Maker is: (P) Legal Next of Kin      Discharge Planning:    Patient lives with: (P) Spouse/Significant Other Type of Home: (P) Apartment  Primary Care Giver: (P) Self  Patient Support Systems include: (P) Parent, Family Members, Spouse/Significant Other   Current Financial resources: (P) Other (Comment) (private insurance)  Current community resources: (P) None  Current services prior to admission: (P) None            Current DME:              Type of Home Care services:  (P) None    ADLS  Prior functional level: (P) Independent in ADLs/IADLs  Current functional level: (P) Independent in ADLs/IADLs    PT AM-PAC:   /24  OT AM-PAC:   /24    Family can provide assistance at DC: (P) Yes  Would you like Case Management to discuss the discharge plan with any other family members/significant others, and if so, who? (P) No  Plans to Return to Present Housing: (P) Unknown at present  Other Identified Issues/Barriers to RETURNING to current housing: none  Potential Assistance needed at discharge: (P) N/A            Potential DME:    Patient expects to discharge to: (P) Unknown  Plan for transportation at discharge:      Financial    Payor: BCBS / Plan: Modesta Pettiter / Product Type: *No Product type* /     Does insurance require precert for SNF: Yes    Potential

## 2023-11-03 NOTE — ED NOTES
Patient moving around in bed. Not following commands. Attempted to redirect patient. Patient drowsy and laying back down. Warm blanket provided.      Abiola Davis RN  11/02/23 1410

## 2023-11-04 ENCOUNTER — APPOINTMENT (OUTPATIENT)
Dept: GENERAL RADIOLOGY | Age: 23
DRG: 917 | End: 2023-11-04
Payer: COMMERCIAL

## 2023-11-04 PROBLEM — J96.01 ACUTE RESPIRATORY FAILURE WITH HYPOXIA (HCC): Status: ACTIVE | Noted: 2023-11-04

## 2023-11-04 PROBLEM — T50.902A INTENTIONAL DRUG OVERDOSE (HCC): Status: ACTIVE | Noted: 2023-11-03

## 2023-11-04 LAB
ALBUMIN SERPL-MCNC: 3.8 G/DL (ref 3.4–5)
ALBUMIN/GLOB SERPL: 1.9 {RATIO} (ref 1.1–2.2)
ALP SERPL-CCNC: 46 U/L (ref 40–129)
ALT SERPL-CCNC: 12 U/L (ref 10–40)
ANION GAP SERPL CALCULATED.3IONS-SCNC: 11 MMOL/L (ref 3–16)
AST SERPL-CCNC: 30 U/L (ref 15–37)
BASE EXCESS BLDA CALC-SCNC: -1.5 MMOL/L (ref -3–3)
BASOPHILS # BLD: 0 K/UL (ref 0–0.2)
BASOPHILS NFR BLD: 0.2 %
BILIRUB SERPL-MCNC: 0.5 MG/DL (ref 0–1)
BUN SERPL-MCNC: 4 MG/DL (ref 7–20)
CALCIUM SERPL-MCNC: 8.5 MG/DL (ref 8.3–10.6)
CHLORIDE SERPL-SCNC: 106 MMOL/L (ref 99–110)
CK SERPL-CCNC: 738 U/L (ref 39–308)
CO2 BLDA-SCNC: 23.3 MMOL/L
CO2 SERPL-SCNC: 20 MMOL/L (ref 21–32)
COHGB MFR BLDA: 0.3 % (ref 0–1.5)
CREAT SERPL-MCNC: 0.8 MG/DL (ref 0.9–1.3)
DEPRECATED RDW RBC AUTO: 13.1 % (ref 12.4–15.4)
EOSINOPHIL # BLD: 0 K/UL (ref 0–0.6)
EOSINOPHIL NFR BLD: 0.3 %
EST. AVERAGE GLUCOSE BLD GHB EST-MCNC: 96.8 MG/DL
GFR SERPLBLD CREATININE-BSD FMLA CKD-EPI: >60 ML/MIN/{1.73_M2}
GLUCOSE BLD-MCNC: 105 MG/DL (ref 70–99)
GLUCOSE BLD-MCNC: 105 MG/DL (ref 70–99)
GLUCOSE BLD-MCNC: 125 MG/DL (ref 70–99)
GLUCOSE BLD-MCNC: 74 MG/DL (ref 70–99)
GLUCOSE BLD-MCNC: 74 MG/DL (ref 70–99)
GLUCOSE BLD-MCNC: 81 MG/DL (ref 70–99)
GLUCOSE BLD-MCNC: 87 MG/DL (ref 70–99)
GLUCOSE BLD-MCNC: 96 MG/DL (ref 70–99)
GLUCOSE SERPL-MCNC: 100 MG/DL (ref 70–99)
HBA1C MFR BLD: 5 %
HCO3 BLDA-SCNC: 22.3 MMOL/L (ref 21–29)
HCT VFR BLD AUTO: 41.2 % (ref 40.5–52.5)
HGB BLD-MCNC: 13.7 G/DL (ref 13.5–17.5)
HGB BLDA-MCNC: 13.9 G/DL (ref 13.5–17.5)
LYMPHOCYTES # BLD: 1.3 K/UL (ref 1–5.1)
LYMPHOCYTES NFR BLD: 17.4 %
MCH RBC QN AUTO: 31 PG (ref 26–34)
MCHC RBC AUTO-ENTMCNC: 33.3 G/DL (ref 31–36)
MCV RBC AUTO: 93 FL (ref 80–100)
METHGB MFR BLDA: 0.3 %
MONOCYTES # BLD: 0.5 K/UL (ref 0–1.3)
MONOCYTES NFR BLD: 6.5 %
NEUTROPHILS # BLD: 5.6 K/UL (ref 1.7–7.7)
NEUTROPHILS NFR BLD: 75.6 %
O2 THERAPY: ABNORMAL
PCO2 BLDA: 34.8 MMHG (ref 35–45)
PERFORMED ON: ABNORMAL
PERFORMED ON: NORMAL
PH BLDA: 7.42 [PH] (ref 7.35–7.45)
PLATELET # BLD AUTO: 171 K/UL (ref 135–450)
PMV BLD AUTO: 7.7 FL (ref 5–10.5)
PO2 BLDA: 147.7 MMHG (ref 75–108)
POTASSIUM SERPL-SCNC: 4.2 MMOL/L (ref 3.5–5.1)
PROT SERPL-MCNC: 5.8 G/DL (ref 6.4–8.2)
RBC # BLD AUTO: 4.43 M/UL (ref 4.2–5.9)
SAO2 % BLDA: 99 %
SODIUM SERPL-SCNC: 137 MMOL/L (ref 136–145)
WBC # BLD AUTO: 7.5 K/UL (ref 4–11)

## 2023-11-04 PROCEDURE — 94003 VENT MGMT INPAT SUBQ DAY: CPT

## 2023-11-04 PROCEDURE — 82803 BLOOD GASES ANY COMBINATION: CPT

## 2023-11-04 PROCEDURE — A4216 STERILE WATER/SALINE, 10 ML: HCPCS | Performed by: INTERNAL MEDICINE

## 2023-11-04 PROCEDURE — 6370000000 HC RX 637 (ALT 250 FOR IP): Performed by: INTERNAL MEDICINE

## 2023-11-04 PROCEDURE — 6360000002 HC RX W HCPCS: Performed by: INTERNAL MEDICINE

## 2023-11-04 PROCEDURE — 85025 COMPLETE CBC W/AUTO DIFF WBC: CPT

## 2023-11-04 PROCEDURE — 82550 ASSAY OF CK (CPK): CPT

## 2023-11-04 PROCEDURE — 5A1945Z RESPIRATORY VENTILATION, 24-96 CONSECUTIVE HOURS: ICD-10-PCS | Performed by: INTERNAL MEDICINE

## 2023-11-04 PROCEDURE — 99233 SBSQ HOSP IP/OBS HIGH 50: CPT | Performed by: INTERNAL MEDICINE

## 2023-11-04 PROCEDURE — 2000000000 HC ICU R&B

## 2023-11-04 PROCEDURE — 83036 HEMOGLOBIN GLYCOSYLATED A1C: CPT

## 2023-11-04 PROCEDURE — 80053 COMPREHEN METABOLIC PANEL: CPT

## 2023-11-04 PROCEDURE — 36415 COLL VENOUS BLD VENIPUNCTURE: CPT

## 2023-11-04 PROCEDURE — 99291 CRITICAL CARE FIRST HOUR: CPT | Performed by: INTERNAL MEDICINE

## 2023-11-04 PROCEDURE — 2700000000 HC OXYGEN THERAPY PER DAY

## 2023-11-04 PROCEDURE — 2580000003 HC RX 258: Performed by: INTERNAL MEDICINE

## 2023-11-04 PROCEDURE — 71045 X-RAY EXAM CHEST 1 VIEW: CPT

## 2023-11-04 PROCEDURE — 2500000003 HC RX 250 WO HCPCS: Performed by: INTERNAL MEDICINE

## 2023-11-04 PROCEDURE — 94761 N-INVAS EAR/PLS OXIMETRY MLT: CPT

## 2023-11-04 RX ORDER — MIDAZOLAM HYDROCHLORIDE 1 MG/ML
2 INJECTION INTRAMUSCULAR; INTRAVENOUS ONCE
Status: COMPLETED | OUTPATIENT
Start: 2023-11-04 | End: 2023-11-04

## 2023-11-04 RX ORDER — DEXTROSE, SODIUM CHLORIDE, SODIUM LACTATE, POTASSIUM CHLORIDE, AND CALCIUM CHLORIDE 5; .6; .31; .03; .02 G/100ML; G/100ML; G/100ML; G/100ML; G/100ML
INJECTION, SOLUTION INTRAVENOUS CONTINUOUS
Status: DISPENSED | OUTPATIENT
Start: 2023-11-04 | End: 2023-11-04

## 2023-11-04 RX ORDER — SODIUM CHLORIDE, SODIUM LACTATE, POTASSIUM CHLORIDE, CALCIUM CHLORIDE 600; 310; 30; 20 MG/100ML; MG/100ML; MG/100ML; MG/100ML
INJECTION, SOLUTION INTRAVENOUS CONTINUOUS
Status: DISCONTINUED | OUTPATIENT
Start: 2023-11-04 | End: 2023-11-08

## 2023-11-04 RX ORDER — DEXTROSE MONOHYDRATE 100 MG/ML
INJECTION, SOLUTION INTRAVENOUS CONTINUOUS PRN
Status: DISCONTINUED | OUTPATIENT
Start: 2023-11-03 | End: 2023-11-04 | Stop reason: SDUPTHER

## 2023-11-04 RX ADMIN — PROPOFOL 40 MCG/KG/MIN: 10 INJECTION, EMULSION INTRAVENOUS at 04:29

## 2023-11-04 RX ADMIN — Medication 50 MCG/HR: at 05:42

## 2023-11-04 RX ADMIN — MIDAZOLAM HYDROCHLORIDE 2 MG: 1 INJECTION, SOLUTION INTRAMUSCULAR; INTRAVENOUS at 14:49

## 2023-11-04 RX ADMIN — FENTANYL CITRATE 50 MCG: 50 INJECTION, SOLUTION INTRAMUSCULAR; INTRAVENOUS at 15:15

## 2023-11-04 RX ADMIN — Medication 25 MCG/HR: at 05:15

## 2023-11-04 RX ADMIN — FAMOTIDINE 20 MG: 10 INJECTION, SOLUTION INTRAVENOUS at 20:30

## 2023-11-04 RX ADMIN — PROPOFOL 40 MCG/KG/MIN: 10 INJECTION, EMULSION INTRAVENOUS at 08:46

## 2023-11-04 RX ADMIN — MIDAZOLAM HYDROCHLORIDE 2 MG: 1 INJECTION, SOLUTION INTRAMUSCULAR; INTRAVENOUS at 16:59

## 2023-11-04 RX ADMIN — MIDAZOLAM HYDROCHLORIDE 2 MG: 1 INJECTION, SOLUTION INTRAMUSCULAR; INTRAVENOUS at 20:18

## 2023-11-04 RX ADMIN — MIDAZOLAM HYDROCHLORIDE 2 MG: 1 INJECTION, SOLUTION INTRAMUSCULAR; INTRAVENOUS at 05:10

## 2023-11-04 RX ADMIN — PROPOFOL 35 MCG/KG/MIN: 10 INJECTION, EMULSION INTRAVENOUS at 14:01

## 2023-11-04 RX ADMIN — FAMOTIDINE 20 MG: 10 INJECTION, SOLUTION INTRAVENOUS at 07:49

## 2023-11-04 RX ADMIN — SODIUM CHLORIDE, POTASSIUM CHLORIDE, SODIUM LACTATE AND CALCIUM CHLORIDE: 600; 310; 30; 20 INJECTION, SOLUTION INTRAVENOUS at 12:24

## 2023-11-04 RX ADMIN — CARBOXYMETHYLCELLULOSE SODIUM 1 DROP: 10 GEL OPHTHALMIC at 07:49

## 2023-11-04 RX ADMIN — PROPOFOL 35 MCG/KG/MIN: 10 INJECTION, EMULSION INTRAVENOUS at 19:43

## 2023-11-04 RX ADMIN — MUPIROCIN: 20 OINTMENT TOPICAL at 20:30

## 2023-11-04 RX ADMIN — MUPIROCIN: 20 OINTMENT TOPICAL at 07:49

## 2023-11-04 RX ADMIN — SODIUM CHLORIDE, SODIUM LACTATE, POTASSIUM CHLORIDE, CALCIUM CHLORIDE AND DEXTROSE MONOHYDRATE: 5; 600; 310; 30; 20 INJECTION, SOLUTION INTRAVENOUS at 01:23

## 2023-11-04 RX ADMIN — MIDAZOLAM HYDROCHLORIDE 2 MG: 1 INJECTION, SOLUTION INTRAMUSCULAR; INTRAVENOUS at 23:17

## 2023-11-04 RX ADMIN — MIDAZOLAM HYDROCHLORIDE 2 MG: 1 INJECTION, SOLUTION INTRAMUSCULAR; INTRAVENOUS at 02:23

## 2023-11-04 RX ADMIN — SODIUM CHLORIDE, PRESERVATIVE FREE 10 ML: 5 INJECTION INTRAVENOUS at 07:53

## 2023-11-04 RX ADMIN — Medication 50 MCG/HR: at 14:05

## 2023-11-04 RX ADMIN — ENOXAPARIN SODIUM 40 MG: 100 INJECTION SUBCUTANEOUS at 07:49

## 2023-11-04 RX ADMIN — SODIUM CHLORIDE, PRESERVATIVE FREE 10 ML: 5 INJECTION INTRAVENOUS at 20:30

## 2023-11-04 RX ADMIN — CARBOXYMETHYLCELLULOSE SODIUM 1 DROP: 10 GEL OPHTHALMIC at 12:27

## 2023-11-04 RX ADMIN — MIDAZOLAM HYDROCHLORIDE 2 MG: 1 INJECTION, SOLUTION INTRAMUSCULAR; INTRAVENOUS at 07:48

## 2023-11-04 RX ADMIN — CARBOXYMETHYLCELLULOSE SODIUM 1 DROP: 10 GEL OPHTHALMIC at 20:30

## 2023-11-04 RX ADMIN — SODIUM CHLORIDE, POTASSIUM CHLORIDE, SODIUM LACTATE AND CALCIUM CHLORIDE: 600; 310; 30; 20 INJECTION, SOLUTION INTRAVENOUS at 22:25

## 2023-11-04 ASSESSMENT — PAIN SCALES - GENERAL
PAINLEVEL_OUTOF10: 0

## 2023-11-04 ASSESSMENT — PULMONARY FUNCTION TESTS
PIF_VALUE: 14

## 2023-11-04 NOTE — PROGRESS NOTES
Still on a vent. Spoke with GF yesterday who says the patient told her he intentionally overdosed on the rest of his Lamictal prescription - about 40 - 150mg tablets. She had broken up with him and he told her \"if I can't be with you then what's the point in living. \"    Will likely need inpatient psychiatric care once medically stable.

## 2023-11-04 NOTE — PROGRESS NOTES
11/03/23 2001   Patient Observation   Observations 7ett 24 at lip   Breath Sounds   Right Upper Lobe Diminished   Right Middle Lobe Diminished   Right Lower Lobe Diminished   Left Upper Lobe Diminished   Left Lower Lobe Diminished   Vent Information   Vent Mode AC/VC   Ventilator Settings   FiO2  30 %   Vt (Set, mL) 400 mL   Resp Rate (Set) 14 bpm   PEEP/CPAP (cmH2O) 5   Vent Patient Data (Readings)   Vt (Measured) 391 mL   Peak Inspiratory Pressure (cmH2O) 14 cmH2O   Rate Measured 14 br/min   Minute Volume (L/min) 5.4 Liters   Mean Airway Pressure (cmH2O) 7.6 cmH20   Plateau Pressure (cm H2O) 9 cm H2O   Driving Pressure 4   I:E Ratio 1:2.9   Flow Sensitivity 40 L/min   Static Compliance (L/cm H2O) 49   Dynamic Compliance (L/cm H2O) 43 L/cm H2O   Vent Alarm Settings   High Pressure (cmH2O) 40 cmH2O   Low Minute Volume (lpm) 2.05 L/min   Low Exhaled Vt (ml) 210 mL   RR High (bpm) 40 br/min   Apnea (secs) 20 secs   Additional Respiratoray Assessments   Humidification Source Heated wire   Humidification Temp 37   Circuit Condensation Drained   Ambu Bag With Mask At Bedside Yes   Airway Clearance   Suction ET Tube   Suction Device Inline suction catheter   Sputum Method Obtained Endotracheal   Sputum Amount Scant   ETT    Placement Date/Time: 11/03/23 0930   Present on Admission/Arrival: No  Placed By: Licensed provider  Placement Verified By: Auscultation; Chest X-ray;Direct visualization;Colorimetric ETCO2 device  Preoxygenation: Yes  Mask Ventilation: Ventilated by m. ..    Secured At 25 cm   Measured From Lips   ETT Placement Left   Secured By Commercial tube bolanos

## 2023-11-04 NOTE — PROCEDURES
Intubation  [facility] 11/3/2023 [id number]  Performed by:DEBBI Knight MD    Consent: The procedure was performed in an emergent situation. Indications: respiratory failure, airway protection, hypoxemia and hypercapnia  Intubation method: direct  Patient status: sedated  Pretreatment medications: midazolam  Laryngoscope size: Glidoscope  Tube size: 7.0 mm  Tube type: uncuffed  Number of attempts: 1  Ventilation between attempts: BVM  Cords visualized: yes  Post-procedure assessment: ETCO2 monitor and chest rise  Breath sounds: equal  Cuff inflated: yes   Tube secured at 24 cm at the lip with: ETT bolanos  Chest x-ray was ordered to confirm placement. Patient tolerance: Patient tolerated the procedure well with no immediate complications.

## 2023-11-04 NOTE — PROGRESS NOTES
Care rounds completed with Dr. Colton Stewart and multidisciplinary team. Reviewed labs, meds, VS, assessment, & plan of care for today.  -addressed family concerns at bedside.   -plan to keep intubated today. Possible wean tomorrow.  -Start TF.   -wean down Propofol for Bradycardia, and increase fentanyl to compensate. See dictated note and new orders for details.

## 2023-11-04 NOTE — PROGRESS NOTES
Reassessment completed (see Flowsheet). All ICU lines remain intact, ICU monitoring continued-   Infusing:  Propofol, fentanyl, LR(see MAR). pt remains on PEEP 5. FiO2 30%. .   Lung sounds clear    Pt agitated at times CPOT 3 and greater. PRN and gtt increases (See MAR). Family at bedside and updated.

## 2023-11-04 NOTE — PROGRESS NOTES
11/04/23 0305   Patient Observation   Observations 7ett 24 at lip   Breath Sounds   Right Upper Lobe Diminished   Right Middle Lobe Diminished   Right Lower Lobe Diminished   Left Upper Lobe Diminished   Left Lower Lobe Diminished   Vent Information   Vent Mode AC/VC   Ventilator Settings   FiO2  30 %   Vt (Set, mL) 400 mL   Resp Rate (Set) 14 bpm   PEEP/CPAP (cmH2O) 5   Vent Patient Data (Readings)   Vt (Measured) 396 mL   Peak Inspiratory Pressure (cmH2O) 14 cmH2O   Rate Measured 14 br/min   Minute Volume (L/min) 55 Liters   Mean Airway Pressure (cmH2O) 7.4 cmH20   I:E Ratio 1:2.9   Flow Sensitivity 40 L/min   Vent Alarm Settings   High Pressure (cmH2O) 40 cmH2O   Low Minute Volume (lpm) 2 L/min   Low Exhaled Vt (ml) 210 mL   RR High (bpm) 40 br/min   Apnea (secs) 20 secs   Additional Respiratoray Assessments   Humidification Source Heated wire   Humidification Temp 37   Circuit Condensation Drained   Ambu Bag With Mask At Bedside Yes   Airway Clearance   Suction ET Tube   Suction Device Inline suction catheter   Sputum Method Obtained Endotracheal   Sputum Amount Scant   ETT    Placement Date/Time: 11/03/23 0930   Present on Admission/Arrival: No  Placed By: Licensed provider  Placement Verified By: Auscultation; Chest X-ray;Direct visualization;Colorimetric ETCO2 device  Preoxygenation: Yes  Mask Ventilation: Ventilated by m. ..    Secured At 25 cm   Measured From Lips   ETT Placement Right   Secured By Commercial tube bolanos

## 2023-11-04 NOTE — PROGRESS NOTES
11/03/23 2326   Patient Observation   Observations 7ett 24 at lip   Breath Sounds   Right Upper Lobe Diminished   Right Middle Lobe Diminished   Right Lower Lobe Diminished   Left Upper Lobe Diminished   Left Lower Lobe Diminished   Vent Information   Vent Mode AC/VC   Ventilator Settings   FiO2  30 %   Vt (Set, mL) 400 mL   Resp Rate (Set) 14 bpm   PEEP/CPAP (cmH2O) 5   Vent Patient Data (Readings)   Vt (Measured) 397 mL   Peak Inspiratory Pressure (cmH2O) 14 cmH2O   Rate Measured 14 br/min   Minute Volume (L/min) 5.5 Liters   Mean Airway Pressure (cmH2O) 7.5 cmH20   I:E Ratio 1:2.9   Flow Sensitivity 40 L/min   Vent Alarm Settings   High Pressure (cmH2O) 40 cmH2O   Low Minute Volume (lpm) 2 L/min   Low Exhaled Vt (ml) 210 mL   RR High (bpm) 40 br/min   Apnea (secs) 20 secs   Additional Respiratoray Assessments   Humidification Source Heated wire   Humidification Temp 37   Circuit Condensation Drained   Ambu Bag With Mask At Bedside Yes   Airway Clearance   Suction ET Tube   Suction Device Inline suction catheter   Sputum Method Obtained Endotracheal   Sputum Amount Scant   ETT    Placement Date/Time: 11/03/23 0930   Present on Admission/Arrival: No  Placed By: Licensed provider  Placement Verified By: Auscultation; Chest X-ray;Direct visualization;Colorimetric ETCO2 device  Preoxygenation: Yes  Mask Ventilation: Ventilated by m. ..    Secured At 25 cm   Measured From Lips   ETT Placement Center   Secured By Commercial tube bolanos

## 2023-11-04 NOTE — PROGRESS NOTES
Shift assessment was completed (see flow sheet). Pt is Sedated, agitated. CPOT: 3- PRN versed to follow (SEE MAR). Pt currently on ventilator- 7 ETT, at [address]. FiO2 at 30, PEEP 5, SpO2 at 100%, Respirations are even/equal,  with clear sounds. Infusing:  Propfol, Fentanyl, D5 in LR (see MAR). Oral Gastric tube Clamped, 5 mL residual observed and returned. IV access- Peripherals and WDL. Case in place with STAT lock, Draining clear/ yellow urine. Scheduled medications to follow. Call light within reach. Bed in lowest position. Bed alarm on. Bilateral Wrist restraints in place and tied, for safety of lines and tubes. Family at bedside and updated on POC. Mother- Cindy Jones called and updated on POC>      Patient is not able to demonstrated the ability to move from a reclining position to an upright position within the recliner. Patient is confused, demented and /or unable to follow instruction.

## 2023-11-04 NOTE — PROGRESS NOTES
BS 74. D5 % in LR started at 100 ml/hr. LR infusion stopped per order.      Electronically signed by Delmar Gooden RN on 11/4/2023 at 1:29 AM

## 2023-11-04 NOTE — PROGRESS NOTES
2000:     Sift assessment completed, see flow sheets. VSS. Pt intubated and sedated. Propofol infusing at 30 mcg/kg/min. LR infusing cont at 100 ml/hr. Patient currently intubated with ETT at 24 at lip. Vent settings are 14/400/+30%/5. OG at 60 cm, clamped. BUE soft wrist restraints in place due to attempts of pulling at ETT and lines. No signs of injury noted and PROM performed. Case catheter in place, patent and draining clear, yellow urine. Bed in lowest position, brakes locked. Monitoring closely. Electronically signed by Eliseo Fletcher RN on 11/4/2023 at 2:10 AM      0000: Reassessment completed, no major changes.

## 2023-11-04 NOTE — PLAN OF CARE
Problem: Discharge Planning  Goal: Discharge to home or other facility with appropriate resources  Outcome: Progressing  Flowsheets (Taken 11/3/2023 2000)  Discharge to home or other facility with appropriate resources: Identify barriers to discharge with patient and caregiver     Problem: Risk for Elopement  Goal: Patient will not exit the unit/facility without proper excort  Outcome: Progressing     Problem: Safety - Medical Restraint  Goal: Remains free of injury from restraints (Restraint for Interference with Medical Device)  Description: INTERVENTIONS:  1. Determine that other, less restrictive measures have been tried or would not be effective before applying the restraint  2. Evaluate the patient's condition at the time of restraint application  3. Inform patient/family regarding the reason for restraint  4.  Q2H: Monitor safety, psychosocial status, comfort, nutrition and hydration  Outcome: Progressing  Flowsheets  Taken 11/4/2023 0200 by Christiana Sosa, RN  Remains free of injury from restraints (restraint for interference with medical device): Determine that other, less restrictive measures have been tried or would not be effective before applying the restraint  Taken 11/4/2023 0000 by Christiana Sosa, RN  Remains free of injury from restraints (restraint for interference with medical device): Determine that other, less restrictive measures have been tried or would not be effective before applying the restraint  Taken 11/3/2023 2200 by Christiana Sosa, RN  Remains free of injury from restraints (restraint for interference with medical device): Determine that other, less restrictive measures have been tried or would not be effective before applying the restraint  Taken 11/3/2023 2000 by Christiana Sosa, RN  Remains free of injury from restraints (restraint for interference with medical device): Evaluate the patient's condition at the time of restraint application  Taken 33/6/0465 1600 by Santos Bray

## 2023-11-04 NOTE — PROGRESS NOTES
On second bolus of D 10 125 ml for hypoglycemia. BS at 2003 73. First bolus administered, BS rechecked in 15 min: 136. BS rechecked again at 2156 83. Recheck at 2250 71. Second bolus infusing now. Will recheck 15 min once completed. Perfect Serve to Dr. Maria L Ledbetter to request gtt.      Electronically signed by Chato Boothe RN on 11/3/2023 at 10:52 PM

## 2023-11-04 NOTE — PROGRESS NOTES
Shift handoff report given to [facility] RN at bedside. Pt is sedated on vent  IV handoff completed. 4 eyes to follow. Call light within reach, bed in lowest position, bed alarm on. End of shift checks completed. Pt has been free of falls for duration of shift. Henrique Gonzalez

## 2023-11-04 NOTE — PROGRESS NOTES
4 Eyes Skin Assessment     NAME:  Amy Gibson  YOB: 2000  MEDICAL RECORD NUMBER:  [id number]    The patient is being assessed for  Other intubated and sedated    I agree that at least one RN has performed a thorough Head to Toe Skin Assessment on the patient. ALL assessment sites listed below have been assessed. Areas assessed by both nurses:    Head, Face, Ears, Shoulders, Back, Chest, Arms, Elbows, Hands, Sacrum. Buttock, Coccyx, Ischium, Legs. Feet and Heels, and Under Medical Devices         Does the Patient have a Wound?  No noted wound(s)       Juwan Prevention initiated by RN: Yes  Wound Care Orders initiated by RN: No    Pressure Injury (Stage 3,4, Unstageable, DTI, NWPT, and Complex wounds) if present, place Wound referral order by RN under : No    New Ostomies, if present place, Ostomy referral order under : No     Nurse 1 eSignature: Electronically signed by Jaime Apple RN on 11/3/23 at 11:08 PM EDT    **SHARE this note so that the co-signing nurse can place an eSignature**    Nurse 2 eSignature: Electronically signed by Rowena Gale RN on 11/4/23 at 6:59 AM EDT

## 2023-11-04 NOTE — PROGRESS NOTES
Comprehensive Nutrition Assessment    Type and Reason for Visit:  Initial, Consult (TF)    Nutrition Recommendations/Plan:   Continue Jevity 1.5 @ 20 ml /hr and increase by 25 ml q 4 hr until goal rate of 55 ml/hr can be met;  continue water flush of 40 ml q 2 hr or per md porter. Malnutrition Assessment:  Malnutrition Status: At risk for malnutrition (Comment) (11/04/23 4912)    Context:  Acute Illness     Findings of the 6 clinical characteristics of malnutrition:  Energy Intake:  No significant decrease in energy intake  Weight Loss:  No significant weight loss     Body Fat Loss:  Unable to assess     Muscle Mass Loss:  Unable to assess    Fluid Accumulation:  Unable to assess     Strength:  Not Performed    Nutrition Assessment:    Pt. nutritionally compromised AEB pt admitted d/t overdose and currently intubated. At risk for further nutrition compromise r/t NPO and dep on enteral feeding to meet 100% of his needs. .  Will start TF: Jevity 1.5 @ 20 ml an increases by 25 ml q 4 hr until goal rat of 55 ml/hr can be met continue water flush per MD guidance. Nutrition Related Findings:    pt currently intubated; OGT placed; 360 Kcal per propofol; Wound Type: None       Current Nutrition Intake & Therapies:    Average Meal Intake: NPO  Average Supplements Intake: NPO  Current Tube Feeding (TF) Orders:  Feeding Route: Orogastric  Formula: Standard with Fiber  Schedule: Continuous  Feeding Regimen: Jevity 1.5 @ 20 ml/hr  Additives/Modulars: None  Water Flushes: 40 ml q 3 hr  Current TF & Flush Orders Provides: TV 400mls; 600 kcal; 25 gr pro; 304 ml + 320 mls from flush  Goal TF & Flush Orders Provides: NA    Anthropometric Measures:  Height: 177.8 cm (5' 10\")  Ideal Body Weight (IBW): 166 lbs (75 kg)    Admission Body Weight: 61.6 kg (135 lb 12.9 oz)  Current Body Weight: 61.6 kg (135 lb 12.9 oz), 81.8 % IBW.  Weight Source: Bed Scale  Current BMI (kg/m2): 19.5  Usual Body Weight: 62 kg (136 lb 11 oz)  % Weight Change (Calculated): -0.6                    BMI Categories: Normal Weight (BMI 18.5-24. 9)    Estimated Daily Nutrient Needs:  Energy Requirements Based On: Kcal/kg  Weight Used for Energy Requirements: Current  Energy (kcal/day): 6211-9263 based ~ 30-33 kcal/kg cbw  Weight Used for Protein Requirements: Current  Protein (g/day): 62-74 based ~ 1-1.2 gr/kg cbw  Method Used for Fluid Requirements: 1 ml/kcal  Fluid (ml/day): 4127-7561    Nutrition Diagnosis:   Inadequate protein-energy intake related to impaired respiratory function as evidenced by NPO or clear liquid status due to medical condition    Nutrition Interventions:   Food and/or Nutrient Delivery: Continue NPO, Start Tube Feeding  Nutrition Education/Counseling: No recommendation at this time  Coordination of Nutrition Care: Continue to monitor while inpatient       Goals:     Goals: Initiate nutrition support, by next RD assessment       Nutrition Monitoring and Evaluation:   Behavioral-Environmental Outcomes: None Identified  Food/Nutrient Intake Outcomes: Enteral Nutrition Intake/Tolerance  Physical Signs/Symptoms Outcomes: Weight, Biochemical Data, Nutrition Focused Physical Findings    Discharge Planning:     Too soon to determine     Jin Camilo, [id number] [facility]  Contact: [phone]

## 2023-11-04 NOTE — PROGRESS NOTES
results for input(s): \"PROTIME\", \"INR\" in the last 72 hours. APTT: No results for input(s): \"APTT\" in the last 72 hours. CARDIAC ENZYMES: No results for input(s): \"CKMB\", \"CKMBINDEX\", \"TROPONINI\" in the last 72 hours. Invalid input(s): \"CKTOTAL;3\"  FASTING LIPID PANEL:No results found for: \"CHOL\", \"HDL\", \"TRIG\"  LIVER PROFILE:   Recent Labs     11/02/23  1408 11/02/23  1451 11/03/23  0306 11/04/23  0500   AST  --  15 19 30   ALT 13  --  12 12   BILITOT 1.0  --  0.8 0.5   ALKPHOS 56  --  50 46          Cultures  Results       ** No results found for the last 336 hours. **              Radiology  XR CHEST PORTABLE   Final Result   No acute disease         XR CHEST PORTABLE   Final Result   No acute pulmonary process. CT HEAD WO CONTRAST   Final Result   No acute intracranial abnormality. XR CHEST PORTABLE    (Results Pending)      Tele- sinus tach- Q tc normal     Assessment:  Principal Problem:    Acute encephalopathy  Active Problems:    Overdose of undetermined intent    Suicide attempt ([address])    Elevated CK    Apnea  Resolved Problems:    * No resolved hospital problems.  *      Plan:    #Intentional drug overdose   # Suicidal intent   # Acute toxic Encephalopathy  -overdosed on  lamictal   -Drug screen - cocaine and MJ  -poison control was contacted,recommended observation but pt got restless and agitated needing several doses of IV meds for  agitation   -Subsequently intubated  -suicide precautions   -sitter at bedside   -psychiatry consulted -. Plan to go to [facility] once medically stable      # Acute resp failure    # Apneic spells with hypercarbia - likely with sedative meds and toxic encephalopathy  - intubated, now placed on vent   - sedation with fentanyl gtt  - imaging neg for acute findings on xray  - supportive care   - weaning in 24 hrs when appropriate     #Cocaine use   -recommend cessation      #Fall out of bed  -CT head negative      #Elevated CK   Non traumatic rhabdomyolysis  - sec to agitation   -received multiple IVF boluses      Full Code  Diet NPO Exceptions are: Ice Chips, Sips of Water with Meds  ADULT TUBE FEEDING; Orogastric; Standard with Fiber; Continuous; 20; Yes; 25; Q 4 hours; 55; 40; Q 3 hours  - Lovenox for DVT prophylaxis        Spoke to pt's LIZZY and grandma at bedside    Spoke to ICU RN      Alicia Kang MD   11/4/2023 4:45 PM

## 2023-11-04 NOTE — PROGRESS NOTES
Reassessment completed (see Flowsheet). All ICU lines remain intact, ICU monitoring continued-   Infusing:  Propofol, Fentanyl, LR (See MAR). pt remains on PEEP 5, Vent AC 30%. Lung sounds clear  pt's blood pressures WDL. Weaning down Propofol and increasing fentanyl. Per Dr Anatoly Ott,. Continuing to monitor.

## 2023-11-05 ENCOUNTER — APPOINTMENT (OUTPATIENT)
Dept: GENERAL RADIOLOGY | Age: 23
DRG: 917 | End: 2023-11-05
Payer: COMMERCIAL

## 2023-11-05 LAB
ANION GAP SERPL CALCULATED.3IONS-SCNC: 9 MMOL/L (ref 3–16)
ANION GAP SERPL CALCULATED.3IONS-SCNC: 9 MMOL/L (ref 3–16)
BASE EXCESS BLDA CALC-SCNC: -0.4 MMOL/L (ref -3–3)
BASOPHILS # BLD: 0 K/UL (ref 0–0.2)
BASOPHILS NFR BLD: 0.1 %
BUN SERPL-MCNC: 5 MG/DL (ref 7–20)
BUN SERPL-MCNC: 5 MG/DL (ref 7–20)
CALCIUM SERPL-MCNC: 8.1 MG/DL (ref 8.3–10.6)
CALCIUM SERPL-MCNC: 8.6 MG/DL (ref 8.3–10.6)
CHLORIDE SERPL-SCNC: 107 MMOL/L (ref 99–110)
CHLORIDE SERPL-SCNC: 109 MMOL/L (ref 99–110)
CK SERPL-CCNC: 774 U/L (ref 39–308)
CO2 BLDA-SCNC: 25.7 MMOL/L
CO2 SERPL-SCNC: 24 MMOL/L (ref 21–32)
CO2 SERPL-SCNC: 25 MMOL/L (ref 21–32)
COHGB MFR BLDA: 0 % (ref 0–1.5)
CREAT SERPL-MCNC: 0.7 MG/DL (ref 0.9–1.3)
CREAT SERPL-MCNC: 0.8 MG/DL (ref 0.9–1.3)
DEPRECATED RDW RBC AUTO: 13.2 % (ref 12.4–15.4)
EOSINOPHIL # BLD: 0 K/UL (ref 0–0.6)
EOSINOPHIL NFR BLD: 0.3 %
GFR SERPLBLD CREATININE-BSD FMLA CKD-EPI: >60 ML/MIN/{1.73_M2}
GFR SERPLBLD CREATININE-BSD FMLA CKD-EPI: >60 ML/MIN/{1.73_M2}
GLUCOSE BLD-MCNC: 111 MG/DL (ref 70–99)
GLUCOSE BLD-MCNC: 120 MG/DL (ref 70–99)
GLUCOSE BLD-MCNC: 124 MG/DL (ref 70–99)
GLUCOSE BLD-MCNC: 128 MG/DL (ref 70–99)
GLUCOSE BLD-MCNC: 99 MG/DL (ref 70–99)
GLUCOSE SERPL-MCNC: 104 MG/DL (ref 70–99)
GLUCOSE SERPL-MCNC: 121 MG/DL (ref 70–99)
HCO3 BLDA-SCNC: 24.4 MMOL/L (ref 21–29)
HCT VFR BLD AUTO: 36.6 % (ref 40.5–52.5)
HGB BLD-MCNC: 12.6 G/DL (ref 13.5–17.5)
HGB BLDA-MCNC: 14.2 G/DL (ref 13.5–17.5)
LAMOTRIGINE SERPL-MCNC: 56.7 UG/ML (ref 3–15)
LYMPHOCYTES # BLD: 0.7 K/UL (ref 1–5.1)
LYMPHOCYTES NFR BLD: 11.2 %
MAGNESIUM SERPL-MCNC: 1.7 MG/DL (ref 1.8–2.4)
MCH RBC QN AUTO: 31.2 PG (ref 26–34)
MCHC RBC AUTO-ENTMCNC: 34.4 G/DL (ref 31–36)
MCV RBC AUTO: 90.7 FL (ref 80–100)
METHGB MFR BLDA: 0.1 %
MONOCYTES # BLD: 0.3 K/UL (ref 0–1.3)
MONOCYTES NFR BLD: 5.1 %
NEUTROPHILS # BLD: 4.9 K/UL (ref 1.7–7.7)
NEUTROPHILS NFR BLD: 83.3 %
O2 THERAPY: NORMAL
PCO2 BLDA: 40.5 MMHG (ref 35–45)
PERFORMED ON: ABNORMAL
PERFORMED ON: NORMAL
PH BLDA: 7.4 [PH] (ref 7.35–7.45)
PHOSPHATE SERPL-MCNC: 3 MG/DL (ref 2.5–4.9)
PLATELET # BLD AUTO: 154 K/UL (ref 135–450)
PMV BLD AUTO: 7.6 FL (ref 5–10.5)
PO2 BLDA: 95.6 MMHG (ref 75–108)
POTASSIUM SERPL-SCNC: 3.5 MMOL/L (ref 3.5–5.1)
POTASSIUM SERPL-SCNC: 3.7 MMOL/L (ref 3.5–5.1)
RBC # BLD AUTO: 4.03 M/UL (ref 4.2–5.9)
SAO2 % BLDA: 97.3 %
SODIUM SERPL-SCNC: 141 MMOL/L (ref 136–145)
SODIUM SERPL-SCNC: 142 MMOL/L (ref 136–145)
WBC # BLD AUTO: 5.8 K/UL (ref 4–11)

## 2023-11-05 PROCEDURE — 87186 SC STD MICRODIL/AGAR DIL: CPT

## 2023-11-05 PROCEDURE — 2500000003 HC RX 250 WO HCPCS: Performed by: INTERNAL MEDICINE

## 2023-11-05 PROCEDURE — 2580000003 HC RX 258: Performed by: INTERNAL MEDICINE

## 2023-11-05 PROCEDURE — 6360000002 HC RX W HCPCS: Performed by: INTERNAL MEDICINE

## 2023-11-05 PROCEDURE — 94003 VENT MGMT INPAT SUBQ DAY: CPT

## 2023-11-05 PROCEDURE — 2000000000 HC ICU R&B

## 2023-11-05 PROCEDURE — 71045 X-RAY EXAM CHEST 1 VIEW: CPT

## 2023-11-05 PROCEDURE — 94761 N-INVAS EAR/PLS OXIMETRY MLT: CPT

## 2023-11-05 PROCEDURE — 2700000000 HC OXYGEN THERAPY PER DAY

## 2023-11-05 PROCEDURE — 85025 COMPLETE CBC W/AUTO DIFF WBC: CPT

## 2023-11-05 PROCEDURE — 87205 SMEAR GRAM STAIN: CPT

## 2023-11-05 PROCEDURE — 87181 SC STD AGAR DILUTION PER AGT: CPT

## 2023-11-05 PROCEDURE — 84100 ASSAY OF PHOSPHORUS: CPT

## 2023-11-05 PROCEDURE — 82803 BLOOD GASES ANY COMBINATION: CPT

## 2023-11-05 PROCEDURE — 82550 ASSAY OF CK (CPK): CPT

## 2023-11-05 PROCEDURE — 83735 ASSAY OF MAGNESIUM: CPT

## 2023-11-05 PROCEDURE — 80048 BASIC METABOLIC PNL TOTAL CA: CPT

## 2023-11-05 PROCEDURE — 6370000000 HC RX 637 (ALT 250 FOR IP): Performed by: INTERNAL MEDICINE

## 2023-11-05 PROCEDURE — 99233 SBSQ HOSP IP/OBS HIGH 50: CPT | Performed by: INTERNAL MEDICINE

## 2023-11-05 PROCEDURE — 87077 CULTURE AEROBIC IDENTIFY: CPT

## 2023-11-05 PROCEDURE — 87070 CULTURE OTHR SPECIMN AEROBIC: CPT

## 2023-11-05 PROCEDURE — 87040 BLOOD CULTURE FOR BACTERIA: CPT

## 2023-11-05 PROCEDURE — 89220 SPUTUM SPECIMEN COLLECTION: CPT

## 2023-11-05 PROCEDURE — 99291 CRITICAL CARE FIRST HOUR: CPT | Performed by: INTERNAL MEDICINE

## 2023-11-05 PROCEDURE — 36415 COLL VENOUS BLD VENIPUNCTURE: CPT

## 2023-11-05 PROCEDURE — A4216 STERILE WATER/SALINE, 10 ML: HCPCS | Performed by: INTERNAL MEDICINE

## 2023-11-05 RX ORDER — DEXAMETHASONE SODIUM PHOSPHATE 4 MG/ML
4 INJECTION, SOLUTION INTRA-ARTICULAR; INTRALESIONAL; INTRAMUSCULAR; INTRAVENOUS; SOFT TISSUE EVERY 8 HOURS
Status: DISCONTINUED | OUTPATIENT
Start: 2023-11-05 | End: 2023-11-07

## 2023-11-05 RX ORDER — DEXMEDETOMIDINE HYDROCHLORIDE 4 UG/ML
.1-1.5 INJECTION, SOLUTION INTRAVENOUS CONTINUOUS
Status: DISCONTINUED | OUTPATIENT
Start: 2023-11-05 | End: 2023-11-05

## 2023-11-05 RX ADMIN — SODIUM CHLORIDE, POTASSIUM CHLORIDE, SODIUM LACTATE AND CALCIUM CHLORIDE: 600; 310; 30; 20 INJECTION, SOLUTION INTRAVENOUS at 16:10

## 2023-11-05 RX ADMIN — ENOXAPARIN SODIUM 40 MG: 100 INJECTION SUBCUTANEOUS at 09:11

## 2023-11-05 RX ADMIN — MIDAZOLAM HYDROCHLORIDE 2 MG: 1 INJECTION, SOLUTION INTRAMUSCULAR; INTRAVENOUS at 13:30

## 2023-11-05 RX ADMIN — AMPICILLIN SODIUM AND SULBACTAM SODIUM 3000 MG: 2; 1 INJECTION, POWDER, FOR SOLUTION INTRAMUSCULAR; INTRAVENOUS at 22:44

## 2023-11-05 RX ADMIN — POTASSIUM CHLORIDE 10 MEQ: 7.46 INJECTION, SOLUTION INTRAVENOUS at 01:00

## 2023-11-05 RX ADMIN — SODIUM CHLORIDE, PRESERVATIVE FREE 10 ML: 5 INJECTION INTRAVENOUS at 09:13

## 2023-11-05 RX ADMIN — MIDAZOLAM HYDROCHLORIDE 2 MG: 1 INJECTION, SOLUTION INTRAMUSCULAR; INTRAVENOUS at 00:39

## 2023-11-05 RX ADMIN — FAMOTIDINE 20 MG: 10 INJECTION, SOLUTION INTRAVENOUS at 20:05

## 2023-11-05 RX ADMIN — SODIUM CHLORIDE, POTASSIUM CHLORIDE, SODIUM LACTATE AND CALCIUM CHLORIDE: 600; 310; 30; 20 INJECTION, SOLUTION INTRAVENOUS at 06:17

## 2023-11-05 RX ADMIN — PROPOFOL 40 MCG/KG/MIN: 10 INJECTION, EMULSION INTRAVENOUS at 01:35

## 2023-11-05 RX ADMIN — MIDAZOLAM HYDROCHLORIDE 2 MG: 1 INJECTION, SOLUTION INTRAMUSCULAR; INTRAVENOUS at 03:01

## 2023-11-05 RX ADMIN — MIDAZOLAM HYDROCHLORIDE 2 MG: 1 INJECTION, SOLUTION INTRAMUSCULAR; INTRAVENOUS at 23:37

## 2023-11-05 RX ADMIN — SODIUM CHLORIDE, PRESERVATIVE FREE 10 ML: 5 INJECTION INTRAVENOUS at 20:09

## 2023-11-05 RX ADMIN — MIDAZOLAM HYDROCHLORIDE 2 MG: 1 INJECTION, SOLUTION INTRAMUSCULAR; INTRAVENOUS at 05:10

## 2023-11-05 RX ADMIN — CARBOXYMETHYLCELLULOSE SODIUM 1 DROP: 10 GEL OPHTHALMIC at 13:30

## 2023-11-05 RX ADMIN — AMPICILLIN SODIUM AND SULBACTAM SODIUM 3000 MG: 2; 1 INJECTION, POWDER, FOR SOLUTION INTRAMUSCULAR; INTRAVENOUS at 17:58

## 2023-11-05 RX ADMIN — PROPOFOL 50 MCG/KG/MIN: 10 INJECTION, EMULSION INTRAVENOUS at 18:45

## 2023-11-05 RX ADMIN — MIDAZOLAM HYDROCHLORIDE 2 MG: 1 INJECTION, SOLUTION INTRAMUSCULAR; INTRAVENOUS at 04:08

## 2023-11-05 RX ADMIN — POTASSIUM CHLORIDE 10 MEQ: 7.46 INJECTION, SOLUTION INTRAVENOUS at 03:20

## 2023-11-05 RX ADMIN — MIDAZOLAM HYDROCHLORIDE 2 MG: 1 INJECTION, SOLUTION INTRAMUSCULAR; INTRAVENOUS at 15:07

## 2023-11-05 RX ADMIN — DEXAMETHASONE SODIUM PHOSPHATE 4 MG: 4 INJECTION INTRA-ARTICULAR; INTRALESIONAL; INTRAMUSCULAR; INTRAVENOUS; SOFT TISSUE at 20:05

## 2023-11-05 RX ADMIN — Medication 125 MCG/HR: at 00:42

## 2023-11-05 RX ADMIN — MIDAZOLAM HYDROCHLORIDE 2 MG: 1 INJECTION, SOLUTION INTRAMUSCULAR; INTRAVENOUS at 07:24

## 2023-11-05 RX ADMIN — Medication 0.2 MCG/KG/HR: at 12:02

## 2023-11-05 RX ADMIN — MIDAZOLAM HYDROCHLORIDE 2 MG: 1 INJECTION, SOLUTION INTRAMUSCULAR; INTRAVENOUS at 12:29

## 2023-11-05 RX ADMIN — CARBOXYMETHYLCELLULOSE SODIUM 1 DROP: 10 GEL OPHTHALMIC at 20:06

## 2023-11-05 RX ADMIN — CARBOXYMETHYLCELLULOSE SODIUM 1 DROP: 10 GEL OPHTHALMIC at 09:12

## 2023-11-05 RX ADMIN — POTASSIUM CHLORIDE 10 MEQ: 7.46 INJECTION, SOLUTION INTRAVENOUS at 02:19

## 2023-11-05 RX ADMIN — MIDAZOLAM HYDROCHLORIDE 2 MG: 1 INJECTION, SOLUTION INTRAMUSCULAR; INTRAVENOUS at 09:13

## 2023-11-05 RX ADMIN — Medication 175 MCG/HR: at 14:34

## 2023-11-05 RX ADMIN — PROPOFOL 40 MCG/KG/MIN: 10 INJECTION, EMULSION INTRAVENOUS at 12:41

## 2023-11-05 RX ADMIN — MIDAZOLAM HYDROCHLORIDE 2 MG: 1 INJECTION, SOLUTION INTRAMUSCULAR; INTRAVENOUS at 01:33

## 2023-11-05 RX ADMIN — DEXAMETHASONE SODIUM PHOSPHATE 4 MG: 4 INJECTION INTRA-ARTICULAR; INTRALESIONAL; INTRAMUSCULAR; INTRAVENOUS; SOFT TISSUE at 12:03

## 2023-11-05 RX ADMIN — MUPIROCIN: 20 OINTMENT TOPICAL at 20:05

## 2023-11-05 RX ADMIN — POTASSIUM CHLORIDE 10 MEQ: 7.46 INJECTION, SOLUTION INTRAVENOUS at 04:21

## 2023-11-05 RX ADMIN — PROPOFOL 40 MCG/KG/MIN: 10 INJECTION, EMULSION INTRAVENOUS at 06:18

## 2023-11-05 RX ADMIN — Medication 150 MCG/HR: at 07:39

## 2023-11-05 RX ADMIN — FAMOTIDINE 20 MG: 10 INJECTION, SOLUTION INTRAVENOUS at 09:12

## 2023-11-05 RX ADMIN — SODIUM CHLORIDE: 9 INJECTION, SOLUTION INTRAVENOUS at 01:59

## 2023-11-05 RX ADMIN — MUPIROCIN: 20 OINTMENT TOPICAL at 09:12

## 2023-11-05 RX ADMIN — CARBOXYMETHYLCELLULOSE SODIUM 1 DROP: 10 GEL OPHTHALMIC at 17:59

## 2023-11-05 RX ADMIN — Medication 175 MCG/HR: at 21:28

## 2023-11-05 RX ADMIN — ACETAMINOPHEN 650 MG: 325 TABLET ORAL at 01:38

## 2023-11-05 ASSESSMENT — PULMONARY FUNCTION TESTS
PIF_VALUE: 16
PIF_VALUE: 16
PIF_VALUE: 14
PIF_VALUE: 16
PIF_VALUE: 16
PIF_VALUE: 7.3
PIF_VALUE: 16

## 2023-11-05 ASSESSMENT — PAIN SCALES - GENERAL: PAINLEVEL_OUTOF10: 0

## 2023-11-05 NOTE — PROGRESS NOTES
11/05/23 0315   Patient Observation   Observations 7ett 25 at lip   Breath Sounds   Right Upper Lobe Diminished   Right Middle Lobe Diminished   Right Lower Lobe Diminished   Left Upper Lobe Diminished   Left Lower Lobe Diminished   Vent Information   Vent Mode AC/VC   Ventilator Settings   FiO2  30 %   Vt (Set, mL) 400 mL   Resp Rate (Set) 14 bpm   PEEP/CPAP (cmH2O) 5   Vent Patient Data (Readings)   Vt (Measured) 395 mL   Peak Inspiratory Pressure (cmH2O) 14 cmH2O   Rate Measured 14 br/min   Minute Volume (L/min) 5.5 Liters   Peak Inspiratory Flow (lpm) 40 L/sec   Mean Airway Pressure (cmH2O) 7.7 cmH20   I:E Ratio 1:2.9   Flow Sensitivity 3 L/min   Vent Alarm Settings   High Pressure (cmH2O) 40 cmH2O   Low Minute Volume (lpm) 2 L/min   Low Exhaled Vt (ml) 210 mL   RR High (bpm) 40 br/min   Apnea (secs) 20 secs   Additional Respiratoray Assessments   Humidification Source Heated wire   Humidification Temp 37   Circuit Condensation Drained   Ambu Bag With Mask At Bedside Yes   Airway Clearance   Suction Trach   Suction Device Inline suction catheter   Sputum Method Obtained Endotracheal   Sputum Amount Scant   ETT    Placement Date/Time: 11/03/23 0930   Present on Admission/Arrival: No  Placed By: Licensed provider  Placement Verified By: Auscultation; Chest X-ray;Direct visualization;Colorimetric ETCO2 device  Preoxygenation: Yes  Mask Ventilation: Ventilated by m. ..    Secured At 25 cm   Measured From Lips   ETT Placement Right   Secured By Commercial tube bolanos

## 2023-11-05 NOTE — PROGRESS NOTES
4 Eyes Skin Assessment     NAME:  Alejandro Baker  YOB: 2000  MEDICAL RECORD NUMBER:  [id number]    The patient is being assessed for  Shift Handoff    I agree that at least one RN has performed a thorough Head to Toe Skin Assessment on the patient. ALL assessment sites listed below have been assessed. Areas assessed by both nurses:    Head, Face, Ears, Shoulders, Back, Chest, Arms, Elbows, Hands, Sacrum. Buttock, Coccyx, Ischium, Legs. Feet and Heels, and Under Medical Devices         Does the Patient have a Wound?  No noted wound(s)       Juwan Prevention initiated by RN: No  Wound Care Orders initiated by RN: No    Pressure Injury (Stage 3,4, Unstageable, DTI, NWPT, and Complex wounds) if present, place Wound referral order by RN under : No    New Ostomies, if present place, Ostomy referral order under : No     Nurse 1 eSignature: Electronically signed by Ev Diamond RN on 11/5/23 at 2:49 AM EST    **SHARE this note so that the co-signing nurse can place an eSignature**    Nurse 2 eSignature: Electronically signed by Nikki Cr RN on 11/5/23 at 5:37 AM EST

## 2023-11-05 NOTE — PROGRESS NOTES
11/04/23 2011   Patient Observation   Observations 7ett 25 at lip   Breath Sounds   Right Upper Lobe Diminished;Clear   Right Middle Lobe Diminished;Clear   Right Lower Lobe Diminished;Clear   Left Upper Lobe Diminished;Clear   Left Lower Lobe Diminished;Clear   Vent Information   Vent Mode AC/VC   Ventilator Settings   FiO2  30 %   Vt (Set, mL) 400 mL   Resp Rate (Set) 14 bpm   PEEP/CPAP (cmH2O) 5   Vent Patient Data (Readings)   Vt (Measured) 392 mL   Peak Inspiratory Pressure (cmH2O) 14 cmH2O   Rate Measured 14 br/min   Minute Volume (L/min) 5.4 Liters   Peak Inspiratory Flow (lpm) 40 L/sec   Mean Airway Pressure (cmH2O) 7.7 cmH20   Plateau Pressure (cm H2O) 13 cm H2O   Driving Pressure 8   I:E Ratio 1:2.9   Flow Sensitivity 3 L/min   Static Compliance (L/cm H2O) 50   Dynamic Compliance (L/cm H2O) 44 L/cm H2O   Vent Alarm Settings   High Pressure (cmH2O) 40 cmH2O   Low Minute Volume (lpm) 2 L/min   Low Exhaled Vt (ml) 210 mL   RR High (bpm) 40 br/min   Apnea (secs) 20 secs   Additional Respiratoray Assessments   Humidification Source Heated wire   Humidification Temp 37   Circuit Condensation Drained   Ambu Bag With Mask At Bedside Yes   Airway Clearance   Suction ET Tube   Suction Device Inline suction catheter   Sputum Method Obtained Endotracheal   Sputum Amount Scant   ETT    Placement Date/Time: 11/03/23 0930   Present on Admission/Arrival: No  Placed By: Licensed provider  Placement Verified By: Auscultation; Chest X-ray;Direct visualization;Colorimetric ETCO2 device  Preoxygenation: Yes  Mask Ventilation: Ventilated by m. ..    Secured At 25 cm   Measured From Lips   ETT Placement Left   Secured By Commercial tube bolanos

## 2023-11-05 NOTE — PLAN OF CARE
Problem: Discharge Planning  Goal: Discharge to home or other facility with appropriate resources  Outcome: Progressing  Flowsheets (Taken 11/4/2023 2000)  Discharge to home or other facility with appropriate resources: Identify barriers to discharge with patient and caregiver     Problem: Risk for Elopement  Goal: Patient will not exit the unit/facility without proper excort  Outcome: Progressing     Problem: Safety - Medical Restraint  Goal: Remains free of injury from restraints (Restraint for Interference with Medical Device)  Description: INTERVENTIONS:  1. Determine that other, less restrictive measures have been tried or would not be effective before applying the restraint  2. Evaluate the patient's condition at the time of restraint application  3. Inform patient/family regarding the reason for restraint  4.  Q2H: Monitor safety, psychosocial status, comfort, nutrition and hydration  Outcome: Progressing  Flowsheets  Taken 11/4/2023 2000 by Isabel Kelly RN  Remains free of injury from restraints (restraint for interference with medical device): Every 2 hours: Monitor safety, psychosocial status, comfort, nutrition and hydration  Taken 11/4/2023 1000 by Dana Vazquez RN  Remains free of injury from restraints (restraint for interference with medical device): Every 2 hours: Monitor safety, psychosocial status, comfort, nutrition and hydration  Taken 11/4/2023 0800 by Dana Vazquez RN  Remains free of injury from restraints (restraint for interference with medical device): Every 2 hours: Monitor safety, psychosocial status, comfort, nutrition and hydration     Problem: Safety - Adult  Goal: Free from fall injury  Outcome: Progressing     Problem: Pain  Goal: Verbalizes/displays adequate comfort level or baseline comfort level  Outcome: Progressing     Problem: Nutrition Deficit:  Goal: Optimize nutritional status  Outcome: Progressing

## 2023-11-05 NOTE — PROGRESS NOTES
Patient QT c increased from 420 to 700 urine now has green tint. Notified Dr. She Dawkins orders given.

## 2023-11-05 NOTE — PROGRESS NOTES
Patient opening eyes trying to sit up in bed. Heart rate increased from 98 to 140 Patient does not follow commands. Propofol titrated per orders. Patient continues to try to sit up out of bed Versed given per orders  Patient heart rate decreased 96 decreased in restlessness.

## 2023-11-05 NOTE — PROGRESS NOTES
11/04/23 2324   Patient Observation   Pulse 78   Respirations 14   SpO2 98 %   Observations ETT SIZE 7.0, SECURED AT 25 LIP LINE. AMBU BAG AT HEAD OF BED. WATER GOOD. Breath Sounds   Right Upper Lobe Diminished   Right Middle Lobe Diminished   Right Lower Lobe Diminished   Left Upper Lobe Diminished   Left Lower Lobe Diminished   Vent Information   Vent Mode AC/VC   Ventilator Settings   FiO2  30 %   Vt (Set, mL) 400 mL   Resp Rate (Set) 14 bpm   PEEP/CPAP (cmH2O) 5   Vent Patient Data (Readings)   Vt (Measured) 399 mL   Peak Inspiratory Pressure (cmH2O) 14 cmH2O   Rate Measured 14 br/min   Minute Volume (L/min) 5.57 Liters   Peak Inspiratory Flow (lpm) 40 L/sec   Mean Airway Pressure (cmH2O) 8 cmH20   Plateau Pressure (cm H2O) 13 cm H2O   Driving Pressure 8   I:E Ratio 1:2.9   Flow Sensitivity 3 L/min   Vent Alarm Settings   High Pressure (cmH2O) 40 cmH2O   Low Minute Volume (lpm) 2 L/min   High Minute Volume (lpm) 20 L/min   Low Exhaled Vt (ml) 210 mL   High Exhaled Vt (ml) 800 mL   RR High (bpm) 40 br/min   Apnea (secs) 20 secs   Additional Respiratoray Assessments   Humidification Source Heated wire   Humidification Temp 37   Circuit Condensation Drained   Ambu Bag With Mask At Bedside Yes   Airway Clearance   Suction ET Tube   Suction Device Inline suction catheter   Sputum Method Obtained Endotracheal   Sputum Amount Scant   Sputum Color/Odor Tan   Sputum Consistency Thick   ETT    Placement Date/Time: 11/03/23 0930   Present on Admission/Arrival: No  Placed By: Licensed provider  Placement Verified By: Auscultation; Chest X-ray;Direct visualization;Colorimetric ETCO2 device  Preoxygenation: Yes  Mask Ventilation: Ventilated by m. ..    Secured At 25 cm   Measured From Lips   ETT Placement (S)  Center   Secured By Commercial tube bolanos   Site Assessment Dry

## 2023-11-06 ENCOUNTER — APPOINTMENT (OUTPATIENT)
Dept: GENERAL RADIOLOGY | Age: 23
DRG: 917 | End: 2023-11-06
Payer: COMMERCIAL

## 2023-11-06 LAB
ANION GAP SERPL CALCULATED.3IONS-SCNC: 12 MMOL/L (ref 3–16)
BASE EXCESS BLDA CALC-SCNC: 0.3 MMOL/L (ref -3–3)
BASOPHILS # BLD: 0 K/UL (ref 0–0.2)
BASOPHILS NFR BLD: 0.2 %
BUN SERPL-MCNC: 6 MG/DL (ref 7–20)
CALCIUM SERPL-MCNC: 8.8 MG/DL (ref 8.3–10.6)
CHLORIDE SERPL-SCNC: 110 MMOL/L (ref 99–110)
CK SERPL-CCNC: 461 U/L (ref 39–308)
CO2 BLDA-SCNC: 25.7 MMOL/L
CO2 SERPL-SCNC: 21 MMOL/L (ref 21–32)
COHGB MFR BLDA: 0.3 % (ref 0–1.5)
CREAT SERPL-MCNC: <0.5 MG/DL (ref 0.9–1.3)
DEPRECATED RDW RBC AUTO: 13.3 % (ref 12.4–15.4)
EOSINOPHIL # BLD: 0 K/UL (ref 0–0.6)
EOSINOPHIL NFR BLD: 0 %
GFR SERPLBLD CREATININE-BSD FMLA CKD-EPI: >60 ML/MIN/{1.73_M2}
GLUCOSE BLD-MCNC: 121 MG/DL (ref 70–99)
GLUCOSE BLD-MCNC: 124 MG/DL (ref 70–99)
GLUCOSE BLD-MCNC: 127 MG/DL (ref 70–99)
GLUCOSE BLD-MCNC: 134 MG/DL (ref 70–99)
GLUCOSE BLD-MCNC: 137 MG/DL (ref 70–99)
GLUCOSE BLD-MCNC: 138 MG/DL (ref 70–99)
GLUCOSE BLD-MCNC: 139 MG/DL (ref 70–99)
GLUCOSE SERPL-MCNC: 140 MG/DL (ref 70–99)
HCO3 BLDA-SCNC: 24.6 MMOL/L (ref 21–29)
HCT VFR BLD AUTO: 39.2 % (ref 40.5–52.5)
HGB BLD-MCNC: 13.2 G/DL (ref 13.5–17.5)
HGB BLDA-MCNC: 12.3 G/DL (ref 13.5–17.5)
LYMPHOCYTES # BLD: 0.4 K/UL (ref 1–5.1)
LYMPHOCYTES NFR BLD: 5.7 %
MCH RBC QN AUTO: 31.4 PG (ref 26–34)
MCHC RBC AUTO-ENTMCNC: 33.8 G/DL (ref 31–36)
MCV RBC AUTO: 92.9 FL (ref 80–100)
METHGB MFR BLDA: 0.1 %
MONOCYTES # BLD: 0.5 K/UL (ref 0–1.3)
MONOCYTES NFR BLD: 6.7 %
NEUTROPHILS # BLD: 6.2 K/UL (ref 1.7–7.7)
NEUTROPHILS NFR BLD: 87.4 %
O2 THERAPY: ABNORMAL
PCO2 BLDA: 38.2 MMHG (ref 35–45)
PERFORMED ON: ABNORMAL
PH BLDA: 7.43 [PH] (ref 7.35–7.45)
PLATELET # BLD AUTO: 130 K/UL (ref 135–450)
PMV BLD AUTO: 8.1 FL (ref 5–10.5)
PO2 BLDA: 100.8 MMHG (ref 75–108)
POTASSIUM SERPL-SCNC: 4.5 MMOL/L (ref 3.5–5.1)
RBC # BLD AUTO: 4.22 M/UL (ref 4.2–5.9)
SAO2 % BLDA: 97.7 %
SODIUM SERPL-SCNC: 143 MMOL/L (ref 136–145)
WBC # BLD AUTO: 7.1 K/UL (ref 4–11)

## 2023-11-06 PROCEDURE — 99291 CRITICAL CARE FIRST HOUR: CPT | Performed by: INTERNAL MEDICINE

## 2023-11-06 PROCEDURE — 94761 N-INVAS EAR/PLS OXIMETRY MLT: CPT

## 2023-11-06 PROCEDURE — A4216 STERILE WATER/SALINE, 10 ML: HCPCS | Performed by: INTERNAL MEDICINE

## 2023-11-06 PROCEDURE — 6370000000 HC RX 637 (ALT 250 FOR IP): Performed by: INTERNAL MEDICINE

## 2023-11-06 PROCEDURE — 2700000000 HC OXYGEN THERAPY PER DAY

## 2023-11-06 PROCEDURE — 80048 BASIC METABOLIC PNL TOTAL CA: CPT

## 2023-11-06 PROCEDURE — 2500000003 HC RX 250 WO HCPCS: Performed by: INTERNAL MEDICINE

## 2023-11-06 PROCEDURE — 6360000002 HC RX W HCPCS: Performed by: INTERNAL MEDICINE

## 2023-11-06 PROCEDURE — 2580000003 HC RX 258: Performed by: INTERNAL MEDICINE

## 2023-11-06 PROCEDURE — 36415 COLL VENOUS BLD VENIPUNCTURE: CPT

## 2023-11-06 PROCEDURE — 94003 VENT MGMT INPAT SUBQ DAY: CPT

## 2023-11-06 PROCEDURE — 71045 X-RAY EXAM CHEST 1 VIEW: CPT

## 2023-11-06 PROCEDURE — 85025 COMPLETE CBC W/AUTO DIFF WBC: CPT

## 2023-11-06 PROCEDURE — 2000000000 HC ICU R&B

## 2023-11-06 PROCEDURE — 82803 BLOOD GASES ANY COMBINATION: CPT

## 2023-11-06 PROCEDURE — 99233 SBSQ HOSP IP/OBS HIGH 50: CPT | Performed by: PSYCHIATRY & NEUROLOGY

## 2023-11-06 PROCEDURE — 82550 ASSAY OF CK (CPK): CPT

## 2023-11-06 PROCEDURE — 99233 SBSQ HOSP IP/OBS HIGH 50: CPT | Performed by: INTERNAL MEDICINE

## 2023-11-06 RX ORDER — LORAZEPAM 2 MG/ML
1 INJECTION INTRAMUSCULAR
Status: DISCONTINUED | OUTPATIENT
Start: 2023-11-06 | End: 2023-11-08

## 2023-11-06 RX ORDER — LORAZEPAM 2 MG/ML
1 INJECTION INTRAMUSCULAR ONCE
Status: COMPLETED | OUTPATIENT
Start: 2023-11-06 | End: 2023-11-06

## 2023-11-06 RX ORDER — SODIUM CHLORIDE 0.9 % (FLUSH) 0.9 %
5-40 SYRINGE (ML) INJECTION EVERY 12 HOURS SCHEDULED
Status: DISCONTINUED | OUTPATIENT
Start: 2023-11-06 | End: 2023-11-06 | Stop reason: SDUPTHER

## 2023-11-06 RX ORDER — LORAZEPAM 2 MG/ML
2 INJECTION INTRAMUSCULAR
Status: DISCONTINUED | OUTPATIENT
Start: 2023-11-06 | End: 2023-11-08

## 2023-11-06 RX ORDER — LORAZEPAM 1 MG/1
1 TABLET ORAL
Status: DISCONTINUED | OUTPATIENT
Start: 2023-11-06 | End: 2023-11-08

## 2023-11-06 RX ORDER — METOPROLOL TARTRATE 5 MG/5ML
5 INJECTION INTRAVENOUS ONCE
Status: COMPLETED | OUTPATIENT
Start: 2023-11-06 | End: 2023-11-06

## 2023-11-06 RX ORDER — LORAZEPAM 2 MG/ML
5 INJECTION INTRAMUSCULAR ONCE
Status: COMPLETED | OUTPATIENT
Start: 2023-11-06 | End: 2023-11-06

## 2023-11-06 RX ORDER — SODIUM CHLORIDE 9 MG/ML
INJECTION, SOLUTION INTRAVENOUS PRN
Status: DISCONTINUED | OUTPATIENT
Start: 2023-11-06 | End: 2023-11-06 | Stop reason: SDUPTHER

## 2023-11-06 RX ORDER — LORAZEPAM 2 MG/ML
INJECTION INTRAMUSCULAR
Status: DISCONTINUED
Start: 2023-11-06 | End: 2023-11-07

## 2023-11-06 RX ORDER — LORAZEPAM 2 MG/1
2 TABLET ORAL
Status: DISCONTINUED | OUTPATIENT
Start: 2023-11-06 | End: 2023-11-08

## 2023-11-06 RX ORDER — SODIUM CHLORIDE 0.9 % (FLUSH) 0.9 %
5-40 SYRINGE (ML) INJECTION PRN
Status: DISCONTINUED | OUTPATIENT
Start: 2023-11-06 | End: 2023-11-06 | Stop reason: SDUPTHER

## 2023-11-06 RX ORDER — DEXMEDETOMIDINE HYDROCHLORIDE 4 UG/ML
.1-2 INJECTION, SOLUTION INTRAVENOUS CONTINUOUS
Status: DISCONTINUED | OUTPATIENT
Start: 2023-11-06 | End: 2023-11-09

## 2023-11-06 RX ORDER — LORAZEPAM 2 MG/ML
2 INJECTION INTRAMUSCULAR ONCE
Status: COMPLETED | OUTPATIENT
Start: 2023-11-06 | End: 2023-11-06

## 2023-11-06 RX ORDER — LORAZEPAM 2 MG/1
4 TABLET ORAL
Status: DISCONTINUED | OUTPATIENT
Start: 2023-11-06 | End: 2023-11-08

## 2023-11-06 RX ORDER — LORAZEPAM 2 MG/ML
4 INJECTION INTRAMUSCULAR
Status: DISCONTINUED | OUTPATIENT
Start: 2023-11-06 | End: 2023-11-08

## 2023-11-06 RX ORDER — LORAZEPAM 2 MG/ML
3 INJECTION INTRAMUSCULAR
Status: DISCONTINUED | OUTPATIENT
Start: 2023-11-06 | End: 2023-11-08

## 2023-11-06 RX ADMIN — Medication 0.7 MCG/KG/HR: at 14:33

## 2023-11-06 RX ADMIN — Medication 2 MCG/KG/HR: at 17:42

## 2023-11-06 RX ADMIN — SODIUM CHLORIDE, PRESERVATIVE FREE 10 ML: 5 INJECTION INTRAVENOUS at 10:17

## 2023-11-06 RX ADMIN — PROPOFOL 50 MCG/KG/MIN: 10 INJECTION, EMULSION INTRAVENOUS at 04:21

## 2023-11-06 RX ADMIN — FAMOTIDINE 20 MG: 10 INJECTION, SOLUTION INTRAVENOUS at 20:16

## 2023-11-06 RX ADMIN — CARBOXYMETHYLCELLULOSE SODIUM 1 DROP: 10 GEL OPHTHALMIC at 10:16

## 2023-11-06 RX ADMIN — DEXAMETHASONE SODIUM PHOSPHATE 4 MG: 4 INJECTION INTRA-ARTICULAR; INTRALESIONAL; INTRAMUSCULAR; INTRAVENOUS; SOFT TISSUE at 12:55

## 2023-11-06 RX ADMIN — AMPICILLIN SODIUM AND SULBACTAM SODIUM 3000 MG: 2; 1 INJECTION, POWDER, FOR SOLUTION INTRAMUSCULAR; INTRAVENOUS at 22:39

## 2023-11-06 RX ADMIN — LORAZEPAM 1 MG: 2 INJECTION INTRAMUSCULAR; INTRAVENOUS at 14:31

## 2023-11-06 RX ADMIN — METOPROLOL TARTRATE 5 MG: 5 INJECTION, SOLUTION INTRAVENOUS at 12:55

## 2023-11-06 RX ADMIN — MUPIROCIN: 20 OINTMENT TOPICAL at 20:17

## 2023-11-06 RX ADMIN — AMPICILLIN SODIUM AND SULBACTAM SODIUM 3000 MG: 2; 1 INJECTION, POWDER, FOR SOLUTION INTRAMUSCULAR; INTRAVENOUS at 04:31

## 2023-11-06 RX ADMIN — AMPICILLIN SODIUM AND SULBACTAM SODIUM 3000 MG: 2; 1 INJECTION, POWDER, FOR SOLUTION INTRAMUSCULAR; INTRAVENOUS at 10:31

## 2023-11-06 RX ADMIN — Medication 175 MCG/HR: at 08:39

## 2023-11-06 RX ADMIN — MIDAZOLAM HYDROCHLORIDE 2 MG: 1 INJECTION, SOLUTION INTRAMUSCULAR; INTRAVENOUS at 04:48

## 2023-11-06 RX ADMIN — LORAZEPAM 2 MG: 2 INJECTION INTRAMUSCULAR; INTRAVENOUS at 16:33

## 2023-11-06 RX ADMIN — DEXAMETHASONE SODIUM PHOSPHATE 4 MG: 4 INJECTION INTRA-ARTICULAR; INTRALESIONAL; INTRAMUSCULAR; INTRAVENOUS; SOFT TISSUE at 03:41

## 2023-11-06 RX ADMIN — SODIUM CHLORIDE, PRESERVATIVE FREE 10 ML: 5 INJECTION INTRAVENOUS at 20:18

## 2023-11-06 RX ADMIN — MIDAZOLAM HYDROCHLORIDE 2 MG: 1 INJECTION, SOLUTION INTRAMUSCULAR; INTRAVENOUS at 09:11

## 2023-11-06 RX ADMIN — SODIUM CHLORIDE, POTASSIUM CHLORIDE, SODIUM LACTATE AND CALCIUM CHLORIDE: 600; 310; 30; 20 INJECTION, SOLUTION INTRAVENOUS at 03:16

## 2023-11-06 RX ADMIN — Medication 175 MCG/HR: at 03:02

## 2023-11-06 RX ADMIN — FAMOTIDINE 20 MG: 10 INJECTION, SOLUTION INTRAVENOUS at 10:16

## 2023-11-06 RX ADMIN — ENOXAPARIN SODIUM 40 MG: 100 INJECTION SUBCUTANEOUS at 10:16

## 2023-11-06 RX ADMIN — LORAZEPAM 2 MG: 2 INJECTION INTRAMUSCULAR; INTRAVENOUS at 21:47

## 2023-11-06 RX ADMIN — MUPIROCIN: 20 OINTMENT TOPICAL at 10:18

## 2023-11-06 RX ADMIN — PROPOFOL 50 MCG/KG/MIN: 10 INJECTION, EMULSION INTRAVENOUS at 00:11

## 2023-11-06 RX ADMIN — AMPICILLIN SODIUM AND SULBACTAM SODIUM 3000 MG: 2; 1 INJECTION, POWDER, FOR SOLUTION INTRAMUSCULAR; INTRAVENOUS at 17:43

## 2023-11-06 RX ADMIN — MIDAZOLAM HYDROCHLORIDE 2 MG: 1 INJECTION, SOLUTION INTRAMUSCULAR; INTRAVENOUS at 07:38

## 2023-11-06 RX ADMIN — MIDAZOLAM HYDROCHLORIDE 2 MG: 1 INJECTION, SOLUTION INTRAMUSCULAR; INTRAVENOUS at 01:28

## 2023-11-06 RX ADMIN — CARBOXYMETHYLCELLULOSE SODIUM 1 DROP: 10 GEL OPHTHALMIC at 20:18

## 2023-11-06 RX ADMIN — DEXAMETHASONE SODIUM PHOSPHATE 4 MG: 4 INJECTION INTRA-ARTICULAR; INTRALESIONAL; INTRAMUSCULAR; INTRAVENOUS; SOFT TISSUE at 20:16

## 2023-11-06 RX ADMIN — SODIUM CHLORIDE, POTASSIUM CHLORIDE, SODIUM LACTATE AND CALCIUM CHLORIDE: 600; 310; 30; 20 INJECTION, SOLUTION INTRAVENOUS at 14:14

## 2023-11-06 RX ADMIN — LORAZEPAM 5 MG: 2 INJECTION INTRAMUSCULAR; INTRAVENOUS at 15:13

## 2023-11-06 RX ADMIN — Medication 2 MCG/KG/HR: at 21:23

## 2023-11-06 RX ADMIN — LORAZEPAM 1 MG: 2 INJECTION INTRAMUSCULAR; INTRAVENOUS at 13:54

## 2023-11-06 RX ADMIN — PROPOFOL 50 MCG/KG/MIN: 10 INJECTION, EMULSION INTRAVENOUS at 09:14

## 2023-11-06 ASSESSMENT — PAIN SCALES - GENERAL
PAINLEVEL_OUTOF10: 0

## 2023-11-06 ASSESSMENT — PULMONARY FUNCTION TESTS
PIF_VALUE: 17
PIF_VALUE: 18
PIF_VALUE: 18

## 2023-11-06 ASSESSMENT — PAIN SCALES - WONG BAKER: WONGBAKER_NUMERICALRESPONSE: 0

## 2023-11-06 NOTE — SIGNIFICANT EVENT
CTSP for anxiety. Patient is clearly anxious, crying out, family is having difficulty redirecting patient. He is pulling at lines. Ativan 1 mg IV X 1 with minimal benefit. Will give 2nd mg of ativan now and start precedex gtt. Restraints for patient safety. Last QTc was 408. Will use haldol if needed.

## 2023-11-06 NOTE — PROGRESS NOTES
Speech Language Pathology  Attempt Note     Name: Tej Hess  : 2000  Medical Diagnosis: Delirium [R41.0]  Suicide attempt ([address]) Ann Miner  Acute encephalopathy [G93.40]  Non-traumatic rhabdomyolysis [M62.82]  Overdose of undetermined intent, initial encounter [T50.904A]  Drug overdose, intentional self-harm, sequela ([address]) [T50.902S]      SLP acknowledged order for evaluation, reviewed pt's chart, spoke with RN. Per RN, pt not appropriate for BSE at this time. ST to continue to follow and re-attempt evaluation tomorrow () as pt is able and appropriate. RN aware. No charges.     Cheryl gArawal M.S. Serge Chung  Speech-language pathologist  [phone]

## 2023-11-06 NOTE — PLAN OF CARE
Problem: Confusion  Goal: Confusion, delirium, dementia, or psychosis is improved or at baseline  Description: INTERVENTIONS:  1. Assess for possible contributors to thought disturbance, including medications, impaired vision or hearing, underlying metabolic abnormalities, dehydration, psychiatric diagnoses, and notify attending LIP  2. [place] high risk fall precautions, as indicated  3. Provide frequent short contacts to provide reality reorientation, refocusing and direction  4. Decrease environmental stimuli, including noise as appropriate  5. Monitor and intervene to maintain adequate nutrition, hydration, elimination, sleep and activity  6. If unable to ensure safety without constant attention obtain sitter and review sitter guidelines with assigned personnel  7.  Initiate Psychosocial CNS and Spiritual Care consult, as indicated  Flowsheets (Taken 11/5/2023 2224)  Effect of thought disturbance (confusion, delirium, dementia, or psychosis) are managed with adequate functional status:   Decrease environmental stimuli, including noise as appropriate   Monitor and intervene to maintain adequate nutrition, hydration, elimination, sleep and activity   Assess for contributors to thought disturbance, including medications, impaired vision or hearing, underlying metabolic abnormalities, dehydration, psychiatric diagnoses, notify LIP   POC initiated

## 2023-11-06 NOTE — PROGRESS NOTES
Neurology Progress Note    ID: Adan Marques is a 23 y.o. male    : 2000     LOS: 3 days     ASSESSMENT    Principal Problem:    Acute encephalopathy  Active Problems:    Intentional drug overdose ([address])    Suicide attempt ([address])    Elevated CK    Apnea    Acute respiratory failure with hypoxia (HCC)  Resolved Problems:    * No resolved hospital problems. *    The patient remains combative. The patient received multiple doses of benzodiazepine. CK is trending up. CT brain showed no acute injury. Lamotrigine level was supratherapeutic. CT brain showed no acute injury. UDS is positive for cannabinoids and cocaine. From neurology perspective, this is likely toxic/metabolic event. The patient required intubation due to encephalopathy. PLAN    1. Benzodiazepine as needed. 2.  Try olanzapine. 3.  Trending CK. 4.  Maintain hydration. .    Medications:  Scheduled Meds:    LORazepam        dexamethasone  4 mg IntraVENous Q8H    ampicillin-sulbactam  3,000 mg IntraVENous Q6H    sodium chloride flush  10 mL IntraVENous 2 times per day    nicotine  1 patch TransDERmal Daily    mupirocin   Each Nostril BID    enoxaparin  40 mg SubCUTAneous Daily    famotidine (PEPCID) injection  20 mg IntraVENous BID    carboxymethylcellulose PF  1 drop Both Eyes 4x Daily     Continuous Infusions:    dexmedetomidine HCl in NaCl 0.7 mcg/kg/hr (23 1433)    lactated ringers IV soln 100 mL/hr at 23 1414    sodium chloride 5 mL/hr at 23 0159    propofol 50 mcg/kg/min (23 0914)    fentaNYL 175 mcg/hr (23 0839)    dextrose       PRN Meds: LORazepam, sodium chloride flush, sodium chloride, potassium chloride, magnesium sulfate, promethazine **OR** ondansetron, acetaminophen **OR** acetaminophen, midazolam, fentanNYL, glucose, dextrose bolus **OR** dextrose bolus, glucagon (rDNA), dextrose, dextrose    OBJECTIVE  Vital signs in the last 24 hours:  Vitals:    23 1400   BP: (!) 159/104 Pulse: (!) 127   Resp: 21   Temp:    SpO2: 97%       Intake/Output last 3 shifts:  I/O last 3 completed shifts: In: 7724.3 [I.V.:4588; NG/GT:2637; IV Piggyback:499.3]  Out: 7472 [Urine:3610]    Intake/Output this shift:  I/O this shift:  In: -   Out: 850 [Urine:850]    Yesterday's Weight:  Wt Readings from Last 1 Encounters:   11/06/23 55.9 kg (123 lb 3.8 oz)       SUBJECTIVE  The patient remains combative. ROS:  Negative except in subjective. Neurological examination:  MENTAL STATUS: Awake but disoriented to time, place and person. LANG/SPEECH: No dysarthria. CRANIAL NERVES: No facial asymmetry. MOTOR: No pronator drift or leg drift. REFLEXES: Generalized 2+. SENSORY: Grossly intact. COORD: No tremor. Labs and Imaging:  I reviewed labs and brain imaging. 50 minutes were spent with the patient with greater than 50% of the time spent in counseling and coordination of care.     Candace Harris MD

## 2023-11-06 NOTE — SIGNIFICANT EVENT
CTSP for severe agitation, risk of self harm. Given additional dose of ativan 2 mg, then repeat 2 additional mg and patient became calm. He is protecting his airway. We have gone up on the preceex infusion and placed wrist restraints. Zyprexa 10 mg X 1 should be given if patient becomes agitated despite precedex and prn ativan.

## 2023-11-06 NOTE — PROGRESS NOTES
11/06/23 0332   Patient Observation   Observations 7ett 25 at lip   Breath Sounds   Right Upper Lobe Clear   Right Middle Lobe Clear   Right Lower Lobe Clear   Left Upper Lobe Clear   Left Lower Lobe Clear   Vent Information   Vent Mode AC/VC   Ventilator Settings   FiO2  40 %   Vt (Set, mL) 400 mL   Resp Rate (Set) 14 bpm   PEEP/CPAP (cmH2O) 5   Vent Patient Data (Readings)   Vt (Measured) 397 mL   Peak Inspiratory Pressure (cmH2O) 17 cmH2O   Rate Measured 14 br/min   Minute Volume (L/min) 5.3 Liters   Peak Inspiratory Flow (lpm) 50 L/sec   Mean Airway Pressure (cmH2O) 7.8 cmH20   I:E Ratio 1:3.9   Flow Sensitivity 3 L/min   Vent Alarm Settings   High Pressure (cmH2O) 40 cmH2O   Low Minute Volume (lpm) 2 L/min   Low Exhaled Vt (ml) 210 mL   RR High (bpm) 40 br/min   Apnea (secs) 20 secs   Additional Respiratoray Assessments   Humidification Source Heated wire   Humidification Temp 36.4   Circuit Condensation Drained   Ambu Bag With Mask At Bedside Yes   Airway Clearance   Suction ET Tube   Suction Device Inline suction catheter   Sputum Method Obtained Endotracheal   Sputum Amount Scant   ETT    Placement Date/Time: 11/03/23 0930   Present on Admission/Arrival: No  Placed By: Licensed provider  Placement Verified By: Auscultation; Chest X-ray;Direct visualization;Colorimetric ETCO2 device  Preoxygenation: Yes  Mask Ventilation: Ventilated by m. ..    Secured At 25 cm   Measured From Lips   ETT Placement Right   Secured By Commercial tube bolanos

## 2023-11-06 NOTE — PROGRESS NOTES
Dr. Mandi Mosqueda made aware of Pt's tachycardia post extubation ('S). Orders received for a one time dose of IV lopressor 5mg. Pt continues to follow commands. Family updated at bedside.

## 2023-11-06 NOTE — PLAN OF CARE
Problem: Discharge Planning  Goal: Discharge to home or other facility with appropriate resources  Outcome: Progressing  Flowsheets (Taken 11/5/2023 1930)  Discharge to home or other facility with appropriate resources: Identify barriers to discharge with patient and caregiver     Problem: Risk for Elopement  Goal: Patient will not exit the unit/facility without proper excort  Outcome: Progressing  Flowsheets (Taken 11/5/2023 1930)  Nursing Interventions for Elopement Risk:   Communicate/escalate to charge nurse the risk of elopement   Communicate/escalate to /other team member the risk of elopement   Communicate/escalate to nursing supervisor the risk of elopement   Communicate to physician the risk for elopement     Problem: Safety - Medical Restraint  Goal: Remains free of injury from restraints (Restraint for Interference with Medical Device)  Description: INTERVENTIONS:  1. Determine that other, less restrictive measures have been tried or would not be effective before applying the restraint  2. Evaluate the patient's condition at the time of restraint application  3. Inform patient/family regarding the reason for restraint  4. Q2H: Monitor safety, psychosocial status, comfort, nutrition and hydration  Outcome: Progressing  Flowsheets  Taken 11/6/2023 0600  Remains free of injury from restraints. Restraints is is still the treatment of choice to protect airway. No injury noted from restraints. Problem: Safety - Adult  Goal: Free from fall injury  Outcome: Progressing  Flowsheets (Taken 11/5/2023 1930)  Free From Fall Injury: Instruct family/caregiver on patient safety   Patient's safety maintained.   Problem: Pain  Goal: Verbalizes/displays adequate comfort level or baseline comfort level  Outcome: Progressing  Flowsheets  Taken 11/6/2023 0000  Verbalizes/displays adequate comfort level or baseline comfort level:   Assess pain using appropriate pain scale   Administer analgesics based on type and severity of pain and evaluate response   Implement non-pharmacological measures as appropriate and evaluate response  Taken 11/5/2023 1930  Verbalizes/displays adequate comfort level or baseline comfort level:   Assess pain using appropriate pain scale   Administer analgesics based on type and severity of pain and evaluate response   Implement non-pharmacological measures as appropriate and evaluate response   On Fentanyl gtt for pains and sedation. Turned and repositioned q2 hrs for comfort and mobility and to prevent skin breakdown. Progressing towards goals. Continue with the current plan of care.

## 2023-11-06 NOTE — PROGRESS NOTES
60 ml's of fentanyl gtt wasted and witnessed with Noemi MUNIZ.  Electronically signed by Araceli Geronimo RN on 11/6/2023 at 5:51 PM     Electronically signed by True Quesada RN on 11/6/2023 at 5:52 PM

## 2023-11-06 NOTE — PROGRESS NOTES
11/05/23 2014   Patient Observation   Observations 7ett 25 at lip   Breath Sounds   Right Upper Lobe Clear   Right Middle Lobe Clear   Right Lower Lobe Diminished   Left Upper Lobe Clear   Left Lower Lobe Diminished   Vent Information   Vent Mode AC/VC   Ventilator Settings   FiO2  40 %   Vt (Set, mL) 400 mL   Resp Rate (Set) 14 bpm   PEEP/CPAP (cmH2O) 5   Vent Patient Data (Readings)   Vt (Measured) 401 mL   Peak Inspiratory Pressure (cmH2O) 16 cmH2O   Rate Measured 14 br/min   Minute Volume (L/min) 5.6 Liters   Peak Inspiratory Flow (lpm) 50 L/sec   Mean Airway Pressure (cmH2O) 7.7 cmH20   I:E Ratio 1:3.9   Flow Sensitivity 3 L/min   Static Compliance (L/cm H2O) 44   Dynamic Compliance (L/cm H2O) 36 L/cm H2O   Vent Alarm Settings   High Pressure (cmH2O) 40 cmH2O   Low Minute Volume (lpm) 2 L/min   Low Exhaled Vt (ml) 210 mL   RR High (bpm) 40 br/min   Apnea (secs) 20 secs   Additional Respiratoray Assessments   Humidification Source Heated wire   Humidification Temp 37   Circuit Condensation Drained   Ambu Bag With Mask At Bedside Yes   Airway Clearance   Suction ET Tube   Suction Device Inline suction catheter   Sputum Method Obtained Endotracheal   Sputum Amount Scant   ETT    Placement Date/Time: 11/03/23 0930   Present on Admission/Arrival: No  Placed By: Licensed provider  Placement Verified By: Auscultation; Chest X-ray;Direct visualization;Colorimetric ETCO2 device  Preoxygenation: Yes  Mask Ventilation: Ventilated by m. ..    Secured At 25 cm   Measured From Lips   ETT Placement Left   Secured By Commercial tube bolanos

## 2023-11-06 NOTE — PROGRESS NOTES
Pt continues to be agitated and attempting to get out of bed. Orders received to re-initiate soft bilateral wrist restraints. 5 mg of IV ativan provided per MD orders. Precedex gtt increased 1.2 mcg/kg/hr. Family updated at beside. Sitter remains at bedside for Pt safety. Will continue to monitor.

## 2023-11-06 NOTE — PROGRESS NOTES
Sitter placed at bedside. Suicide precautions initiated. Psych made aware that Pt was extubated. Will continue to monitor.

## 2023-11-06 NOTE — PROGRESS NOTES
11/05/23 2334   Patient Observation   Observations 7ett 25 at lip   Breath Sounds   Right Upper Lobe Clear   Right Middle Lobe Clear   Right Lower Lobe Clear   Left Upper Lobe Clear   Left Lower Lobe Clear   Vent Information   Vent Mode AC/VC   Ventilator Settings   FiO2  40 %   Vt (Set, mL) 400 mL   Resp Rate (Set) 14 bpm   PEEP/CPAP (cmH2O) 5   Vent Patient Data (Readings)   Vt (Measured) 423 mL   Peak Inspiratory Pressure (cmH2O) 16 cmH2O   Rate Measured 14 br/min   Minute Volume (L/min) 5.6 Liters   Peak Inspiratory Flow (lpm) 50 L/sec   Mean Airway Pressure (cmH2O) 7.7 cmH20   I:E Ratio 1:3.9   Flow Sensitivity 3 L/min   Vent Alarm Settings   High Pressure (cmH2O) 40 cmH2O   Low Minute Volume (lpm) 2 L/min   Low Exhaled Vt (ml) 210 mL   RR High (bpm) 40 br/min   Apnea (secs) 20 secs   Additional Respiratoray Assessments   Humidification Source Heated wire   Humidification Temp 37   Circuit Condensation Drained   Ambu Bag With Mask At Bedside Yes   Airway Clearance   Suction ET Tube   Suction Device Inline suction catheter   Sputum Method Obtained Endotracheal   Sputum Amount Scant   ETT    Placement Date/Time: 11/03/23 0930   Present on Admission/Arrival: No  Placed By: Licensed provider  Placement Verified By: Auscultation; Chest X-ray;Direct visualization;Colorimetric ETCO2 device  Preoxygenation: Yes  Mask Ventilation: Ventilated by m. ..    Secured At 25 cm   Measured From Lips   ETT Placement Center   Secured By Commercial tube bolanos

## 2023-11-06 NOTE — PROGRESS NOTES
4 Eyes Skin Assessment     NAME:  Yogi Benitez  YOB: 2000  MEDICAL RECORD NUMBER:  [id number]    The patient is being assessed for  Shift Handoff    I agree that at least one RN has performed a thorough Head to Toe Skin Assessment on the patient. ALL assessment sites listed below have been assessed. Areas assessed by both nurses:    Head, Face, Ears, Shoulders, Back, Chest, Arms, Elbows, Hands, Sacrum. Buttock, Coccyx, Ischium, Legs. Feet and Heels, and Under Medical Devices         Does the Patient have a Wound?  No noted wound(s)       [place] Prevention initiated by RN: Yes  Wound Care Orders initiated by RN: Yes    Pressure Injury (Stage 3,4, Unstageable, DTI, NWPT, and Complex wounds) if present, place Wound referral order by RN under : No    New Ostomies, if present place, Ostomy referral order under : No     Nurse 1 eSignature: Electronically signed by Rena Murphy RN on 11/5/23 at 10:56 PM EST    **SHARE this note so that the co-signing nurse can place an eSignature**    Nurse 2 eSignature: Electronically signed by Donna Wallace RN on 11/6/23 at 12:23 AM EST

## 2023-11-06 NOTE — PROGRESS NOTES
Occupational Therapy and Physical Therapy    Order received and chart reviewed. RN requesting to hold OT/PT evaluation at this time.        Liam OTR/L #[id number]  Gorden Mortimer, PT

## 2023-11-06 NOTE — PLAN OF CARE
Problem: Self Harm/Suicidality  Goal: Will have no self-injury during hospital stay  Description: INTERVENTIONS:  1. Ensure constant observer at bedside with Q15M safety checks  2. Maintain a safe environment  3. Secure patient belongings  4. Ensure family/visitors adhere to safety recommendations  5. Ensure safety tray has been added to patient's diet order  6.   Every shift and PRN: Re-assess suicidal risk via Frequent Screener    Outcome: Progressing     Problem: Self Harm/Suicidality  Goal: No signs of physiological stress  Description: No signs of physiological stress  Outcome: Progressing

## 2023-11-06 NOTE — CARE COORDINATION
Patient awoke several times today and became highly agitated. Patient sedated. Calm at this time. Spoke to Fabulyzer for follow up on patient. The patient will be transferred to [facility] when  he is medically stable.

## 2023-11-07 ENCOUNTER — APPOINTMENT (OUTPATIENT)
Dept: GENERAL RADIOLOGY | Age: 23
DRG: 917 | End: 2023-11-07
Payer: COMMERCIAL

## 2023-11-07 LAB
ANION GAP SERPL CALCULATED.3IONS-SCNC: 10 MMOL/L (ref 3–16)
BASOPHILS # BLD: 0 K/UL (ref 0–0.2)
BASOPHILS NFR BLD: 0.1 %
BUN SERPL-MCNC: 10 MG/DL (ref 7–20)
CALCIUM SERPL-MCNC: 8.7 MG/DL (ref 8.3–10.6)
CHLORIDE SERPL-SCNC: 109 MMOL/L (ref 99–110)
CK SERPL-CCNC: 5291 U/L (ref 39–308)
CO2 SERPL-SCNC: 23 MMOL/L (ref 21–32)
CREAT SERPL-MCNC: <0.5 MG/DL (ref 0.9–1.3)
DEPRECATED RDW RBC AUTO: 13.1 % (ref 12.4–15.4)
EOSINOPHIL # BLD: 0 K/UL (ref 0–0.6)
EOSINOPHIL NFR BLD: 0 %
GFR SERPLBLD CREATININE-BSD FMLA CKD-EPI: >60 ML/MIN/{1.73_M2}
GLUCOSE BLD-MCNC: 103 MG/DL (ref 70–99)
GLUCOSE BLD-MCNC: 107 MG/DL (ref 70–99)
GLUCOSE BLD-MCNC: 115 MG/DL (ref 70–99)
GLUCOSE BLD-MCNC: 117 MG/DL (ref 70–99)
GLUCOSE BLD-MCNC: 98 MG/DL (ref 70–99)
GLUCOSE SERPL-MCNC: 124 MG/DL (ref 70–99)
HCT VFR BLD AUTO: 36.5 % (ref 40.5–52.5)
HGB BLD-MCNC: 12.2 G/DL (ref 13.5–17.5)
LYMPHOCYTES # BLD: 0.5 K/UL (ref 1–5.1)
LYMPHOCYTES NFR BLD: 6.3 %
MCH RBC QN AUTO: 31 PG (ref 26–34)
MCHC RBC AUTO-ENTMCNC: 33.4 G/DL (ref 31–36)
MCV RBC AUTO: 93 FL (ref 80–100)
MONOCYTES # BLD: 0.4 K/UL (ref 0–1.3)
MONOCYTES NFR BLD: 5.5 %
NEUTROPHILS # BLD: 6.3 K/UL (ref 1.7–7.7)
NEUTROPHILS NFR BLD: 88.1 %
PERFORMED ON: ABNORMAL
PERFORMED ON: NORMAL
PLATELET # BLD AUTO: 178 K/UL (ref 135–450)
PMV BLD AUTO: 8.7 FL (ref 5–10.5)
POTASSIUM SERPL-SCNC: 4 MMOL/L (ref 3.5–5.1)
RBC # BLD AUTO: 3.93 M/UL (ref 4.2–5.9)
SODIUM SERPL-SCNC: 142 MMOL/L (ref 136–145)
WBC # BLD AUTO: 7.2 K/UL (ref 4–11)

## 2023-11-07 PROCEDURE — 6360000002 HC RX W HCPCS: Performed by: INTERNAL MEDICINE

## 2023-11-07 PROCEDURE — 2580000003 HC RX 258: Performed by: INTERNAL MEDICINE

## 2023-11-07 PROCEDURE — 36415 COLL VENOUS BLD VENIPUNCTURE: CPT

## 2023-11-07 PROCEDURE — 2700000000 HC OXYGEN THERAPY PER DAY

## 2023-11-07 PROCEDURE — 2000000000 HC ICU R&B

## 2023-11-07 PROCEDURE — 99233 SBSQ HOSP IP/OBS HIGH 50: CPT | Performed by: PSYCHIATRY & NEUROLOGY

## 2023-11-07 PROCEDURE — 94761 N-INVAS EAR/PLS OXIMETRY MLT: CPT

## 2023-11-07 PROCEDURE — 80048 BASIC METABOLIC PNL TOTAL CA: CPT

## 2023-11-07 PROCEDURE — 99233 SBSQ HOSP IP/OBS HIGH 50: CPT | Performed by: INTERNAL MEDICINE

## 2023-11-07 PROCEDURE — 85025 COMPLETE CBC W/AUTO DIFF WBC: CPT

## 2023-11-07 PROCEDURE — 99232 SBSQ HOSP IP/OBS MODERATE 35: CPT | Performed by: PSYCHIATRY & NEUROLOGY

## 2023-11-07 PROCEDURE — 82550 ASSAY OF CK (CPK): CPT

## 2023-11-07 PROCEDURE — 2500000003 HC RX 250 WO HCPCS: Performed by: INTERNAL MEDICINE

## 2023-11-07 RX ORDER — HALOPERIDOL 5 MG/ML
5 INJECTION INTRAMUSCULAR EVERY 4 HOURS PRN
Status: DISCONTINUED | OUTPATIENT
Start: 2023-11-07 | End: 2023-11-08

## 2023-11-07 RX ORDER — KETAMINE HYDROCHLORIDE 100 MG/ML
1 INJECTION INTRAMUSCULAR; INTRAVENOUS
Status: ACTIVE | OUTPATIENT
Start: 2023-11-07 | End: 2023-11-08

## 2023-11-07 RX ADMIN — HALOPERIDOL LACTATE 5 MG: 5 INJECTION, SOLUTION INTRAMUSCULAR at 18:07

## 2023-11-07 RX ADMIN — DEXAMETHASONE SODIUM PHOSPHATE 4 MG: 4 INJECTION INTRA-ARTICULAR; INTRALESIONAL; INTRAMUSCULAR; INTRAVENOUS; SOFT TISSUE at 03:40

## 2023-11-07 RX ADMIN — MUPIROCIN: 20 OINTMENT TOPICAL at 20:08

## 2023-11-07 RX ADMIN — HALOPERIDOL LACTATE 5 MG: 5 INJECTION, SOLUTION INTRAMUSCULAR at 21:03

## 2023-11-07 RX ADMIN — Medication 1.4 MCG/KG/HR: at 22:02

## 2023-11-07 RX ADMIN — SODIUM CHLORIDE, POTASSIUM CHLORIDE, SODIUM LACTATE AND CALCIUM CHLORIDE: 600; 310; 30; 20 INJECTION, SOLUTION INTRAVENOUS at 23:54

## 2023-11-07 RX ADMIN — LORAZEPAM 4 MG: 2 INJECTION INTRAMUSCULAR; INTRAVENOUS at 02:45

## 2023-11-07 RX ADMIN — LORAZEPAM 4 MG: 2 INJECTION INTRAMUSCULAR; INTRAVENOUS at 21:59

## 2023-11-07 RX ADMIN — AMPICILLIN SODIUM AND SULBACTAM SODIUM 3000 MG: 2; 1 INJECTION, POWDER, FOR SOLUTION INTRAMUSCULAR; INTRAVENOUS at 16:56

## 2023-11-07 RX ADMIN — MUPIROCIN: 20 OINTMENT TOPICAL at 10:22

## 2023-11-07 RX ADMIN — LORAZEPAM 2 MG: 2 INJECTION INTRAMUSCULAR; INTRAVENOUS at 15:09

## 2023-11-07 RX ADMIN — SODIUM CHLORIDE, PRESERVATIVE FREE 10 ML: 5 INJECTION INTRAVENOUS at 10:31

## 2023-11-07 RX ADMIN — WATER 10 MG: 1 INJECTION INTRAMUSCULAR; INTRAVENOUS; SUBCUTANEOUS at 04:40

## 2023-11-07 RX ADMIN — LORAZEPAM 2 MG: 2 INJECTION INTRAMUSCULAR; INTRAVENOUS at 10:29

## 2023-11-07 RX ADMIN — SODIUM CHLORIDE, POTASSIUM CHLORIDE, SODIUM LACTATE AND CALCIUM CHLORIDE: 600; 310; 30; 20 INJECTION, SOLUTION INTRAVENOUS at 17:15

## 2023-11-07 RX ADMIN — AMPICILLIN SODIUM AND SULBACTAM SODIUM 3000 MG: 2; 1 INJECTION, POWDER, FOR SOLUTION INTRAMUSCULAR; INTRAVENOUS at 04:32

## 2023-11-07 RX ADMIN — SODIUM CHLORIDE, POTASSIUM CHLORIDE, SODIUM LACTATE AND CALCIUM CHLORIDE: 600; 310; 30; 20 INJECTION, SOLUTION INTRAVENOUS at 00:26

## 2023-11-07 RX ADMIN — Medication 1 MCG/KG/HR: at 02:01

## 2023-11-07 RX ADMIN — Medication 1.2 MCG/KG/HR: at 09:43

## 2023-11-07 RX ADMIN — AMPICILLIN SODIUM AND SULBACTAM SODIUM 3000 MG: 2; 1 INJECTION, POWDER, FOR SOLUTION INTRAMUSCULAR; INTRAVENOUS at 22:00

## 2023-11-07 RX ADMIN — Medication 1.4 MCG/KG/HR: at 17:10

## 2023-11-07 RX ADMIN — AMPICILLIN SODIUM AND SULBACTAM SODIUM 3000 MG: 2; 1 INJECTION, POWDER, FOR SOLUTION INTRAMUSCULAR; INTRAVENOUS at 11:35

## 2023-11-07 RX ADMIN — LORAZEPAM 4 MG: 2 INJECTION INTRAMUSCULAR; INTRAVENOUS at 03:47

## 2023-11-07 RX ADMIN — ENOXAPARIN SODIUM 40 MG: 100 INJECTION SUBCUTANEOUS at 10:22

## 2023-11-07 RX ADMIN — LORAZEPAM 2 MG: 2 INJECTION INTRAMUSCULAR; INTRAVENOUS at 16:01

## 2023-11-07 ASSESSMENT — PAIN SCALES - GENERAL
PAINLEVEL_OUTOF10: 0

## 2023-11-07 ASSESSMENT — PAIN SCALES - WONG BAKER: WONGBAKER_NUMERICALRESPONSE: 0

## 2023-11-07 NOTE — PLAN OF CARE
Problem: Confusion  Goal: Confusion, delirium, dementia, or psychosis is improved or at baseline  Description: INTERVENTIONS:  1. Assess for possible contributors to thought disturbance, including medications, impaired vision or hearing, underlying metabolic abnormalities, dehydration, psychiatric diagnoses, and notify attending LIP  2. [place] high risk fall precautions, as indicated  3. Provide frequent short contacts to provide reality reorientation, refocusing and direction  4. Decrease environmental stimuli, including noise as appropriate  5. Monitor and intervene to maintain adequate nutrition, hydration, elimination, sleep and activity  6. If unable to ensure safety without constant attention obtain sitter and review sitter guidelines with assigned personnel  7. Initiate Psychosocial CNS and Spiritual Care consult, as indicated  11/7/2023 0616 by Luis E Love RN  Outcome: Not Progressing  11/7/2023 0614 by Luis E Love RN  Outcome: Not Progressing  Flowsheets  Taken 11/7/2023 0353  Effect of thought disturbance (confusion, delirium, dementia, or psychosis) are managed with adequate functional status: Decrease environmental stimuli, including noise as appropriate  Taken 11/6/2023 2000  Effect of thought disturbance (confusion, delirium, dementia, or psychosis) are managed with adequate functional status:   Decrease environmental stimuli, including noise as appropriate   [place] high risk fall precautions, as indicated   Assess for contributors to thought disturbance, including medications, impaired vision or hearing, underlying metabolic abnormalities, dehydration, psychiatric diagnoses, notify LIP     Problem: Confusion  Goal: Confusion, delirium, dementia, or psychosis is improved or at baseline  Description: INTERVENTIONS:  1.  Assess for possible contributors to thought disturbance, including medications, impaired vision or hearing, underlying metabolic abnormalities, dehydration, psychiatric diagnoses, and notify attending LIP  2. [place] high risk fall precautions, as indicated  3. Provide frequent short contacts to provide reality reorientation, refocusing and direction  4. Decrease environmental stimuli, including noise as appropriate  5. Monitor and intervene to maintain adequate nutrition, hydration, elimination, sleep and activity  6. If unable to ensure safety without constant attention obtain sitter and review sitter guidelines with assigned personnel  7. Initiate Psychosocial CNS and Spiritual Care consult, as indicated  11/7/2023 0616 by Ty Quiroga RN  Outcome: Not Progressing  Remains confused and disoriented. Restless and agitated intermittently. Continue on Precedex gtt. Sitter at bedside for safety. Problem: Risk for Elopement  Goal: Patient will not exit the unit/facility without proper excort  11/7/2023 0616 by Ty Quiroga RN  Outcome: Progressing  11/7/2023 0614 by Ty Quiroga RN  Outcome: Progressing  Flowsheets  Taken 11/6/2023 2000 by Ty Quiroga RN  Nursing Interventions for Elopement Risk: Communicate/escalate to charge nurse the risk of elopement  Taken 11/6/2023 1735 by Hector Lim RN  Nursing Interventions for Elopement Risk:   Communicate/escalate to charge nurse the risk of elopement   Communicate/escalate to /other team member the risk of elopement   Communicate/escalate to nursing supervisor the risk of elopement   Communicate to physician the risk for elopement     Problem: Safety - Medical Restraint  Goal: Remains free of injury from restraints (Restraint for Interference with Medical Device)  Description: INTERVENTIONS:  1. Determine that other, less restrictive measures have been tried or would not be effective before applying the restraint  2. Evaluate the patient's condition at the time of restraint application  3. Inform patient/family regarding the reason for restraint  4.  Q2H: Monitor safety, psychosocial

## 2023-11-07 NOTE — PLAN OF CARE
Problem: Self Harm/Suicidality  Goal: Will have no self-injury during hospital stay  Description: INTERVENTIONS:  1. Ensure constant observer at bedside with Q15M safety checks  2. Maintain a safe environment  3. Secure patient belongings  4. Ensure family/visitors adhere to safety recommendations  5. Ensure safety tray has been added to patient's diet order  6. Every shift and PRN: Re-assess suicidal risk via Frequent Screener    11/7/2023 1230 by Apolinar Nunez RN  Outcome: Progressing  11/7/2023 1229 by Apolinar Nunez RN  Outcome: Progressing  11/7/2023 1228 by Apolinar Nunez RN  Outcome: Progressing  11/7/2023 0616 by Rena Murphy RN  Outcome: Progressing  11/7/2023 0614 by Rena Murphy RN  Outcome: Progressing  Flowsheets  Taken 11/7/2023 0353  Will have no self-injury during hospital stay: Maintain a safe environment  Taken 11/6/2023 2000  Will have no self-injury during hospital stay:   Maintain a safe environment   Ensure constant observer at bedside with Q15M safety checks     Problem: Confusion  Goal: Confusion, delirium, dementia, or psychosis is improved or at baseline  Description: INTERVENTIONS:  1. Assess for possible contributors to thought disturbance, including medications, impaired vision or hearing, underlying metabolic abnormalities, dehydration, psychiatric diagnoses, and notify attending LIP  2. [place] high risk fall precautions, as indicated  3. Provide frequent short contacts to provide reality reorientation, refocusing and direction  4. Decrease environmental stimuli, including noise as appropriate  5. Monitor and intervene to maintain adequate nutrition, hydration, elimination, sleep and activity  6. If unable to ensure safety without constant attention obtain sitter and review sitter guidelines with assigned personnel  7.  Initiate Psychosocial CNS and Spiritual Care consult, as indicated  11/7/2023 0616 by Rena Murphy RN  Outcome: Not Progressing  11/7/2023 0614 by Christen Conner, RN  Outcome: Not Progressing  Flowsheets  Taken 11/7/2023 0353  Effect of thought disturbance (confusion, delirium, dementia, or psychosis) are managed with adequate functional status: Decrease environmental stimuli, including noise as appropriate  Taken 11/6/2023 2000  Effect of thought disturbance (confusion, delirium, dementia, or psychosis) are managed with adequate functional status:   Decrease environmental stimuli, including noise as appropriate   [place] high risk fall precautions, as indicated   Assess for contributors to thought disturbance, including medications, impaired vision or hearing, underlying metabolic abnormalities, dehydration, psychiatric diagnoses, notify LIP

## 2023-11-07 NOTE — PROGRESS NOTES
Occupational Therapy/Physical Therapy    Attempted OT/PT eval,  RN requesting to hold OT/PT evaluation at this time.    Carlos Alberto Carey OTR/L [address] [place] [address], [address] [place] [address], [facility] [address]

## 2023-11-07 NOTE — PROGRESS NOTES
Speech Language Pathology  Swallowing Disorders and Dysphagia  Clinical Bedside Swallow Assessment  Facility/Department: [facility] ICU    ***paste diet rec's here    Brandee Snyder  : 2000 (23 y.o.)   MRN: [id number]  ROOM: [id number]/3014-01  ADMISSION DATE: 2023  PATIENT DIAGNOSIS(ES): Delirium [R41.0]  Suicide attempt ([address]) [place]  Acute encephalopathy [G93.40]  Non-traumatic rhabdomyolysis [M62.82]  Overdose of undetermined intent, initial encounter [T50.904A]  Drug overdose, intentional self-harm, sequela ([address]) [T50.902S]  Chief Complaint   Patient presents with    Drug Overdose     Patient took unknown amount of Lamictal per family the bottle was almost full    Suicidal     Patient Active Problem List    Diagnosis Date Noted    Acute respiratory failure with hypoxia ([address]) 2023    Acute encephalopathy 2023    Intentional drug overdose ([address]) 2023    Suicide attempt ([address]) 2023    Elevated CK 2023    Apnea 2023     Past Medical History:   Diagnosis Date    ADHD (attention deficit hyperactivity disorder)     Depression     DMDD (disruptive mood dysregulation disorder) ([address])     OCD (obsessive compulsive disorder)      Past Surgical History:   Procedure Laterality Date    CHOLECYSTECTOMY      GALLBLADDER SURGERY      TONSILLECTOMY      URETHRAL DILATION - FILIFORM (MALE)       No Known Allergies    DATE ONSET: 11/3/23    Date of Evaluation: 2023   Evaluating Therapist: Cecile Jean Baptiste    Chart Reviewed: : [x] Yes [] No     Current Diet: Diet NPO Exceptions are: Ice Chips, Sips of Water with Meds    Recent Chest Radiography: [x] Chest XR   [] CT of Chest   Date: 23  Impressions  Mild left basilar atelectasis or airspace disease, similar to prior. Recent Neuro Radiography: [] MRI Brain    [x] CT Head WO Contrast  [] CTA Head/Neck W Contrast   [] N/A   Date: 23  Impressions  No acute intracranial abnormality.     Pain: The patient does not complain of pain***    Reason for Referral  Alvy Cushing was referred for a bedside swallow evaluation to assess the efficiency of their swallow function, identify signs and symptoms of aspiration and make recommendations regarding safe dietary consistencies, effective compensatory strategies, and safe eating environment. Assessment    Medical record review/interview: Per MD H&P: \" The patient is a 23 y.o. male with pmhx of depression, ADHD who presented to [facility] ED after intentional drug overdose. Patient took approximately 20-30 tablets of 150 mg of lamictal around 11:30 am yesterday     Poison control was contacted and recommended monitoring. After ER reassessment with in few hrs,  Patient was extremely agitated and aggressive needing IV sedatives frequently. In the last 12 hrs he received 2 doses of IV ketamine for severe agitation, IV haldol x 11 mg and several doses of ativan. Remains confused and restless needing ICU admission this am     Upon arrival to ICU , pt noted to be restless and periodic  apneic spells with elevated endtidal co2 and eventually got placed on vent for impending resp failure\"           Patient Complaints:   {dysphagia complaints:[id number]}    Baseline Method of Oral Meds: ***  [] Whole with liquid    [] Cut with liquid    [] Whole with puree    [] Cut in puree    [] Crushed in puree    [] Crushed in liquid    [] Via TF  [] Unknown    Additional Pertinent Information and Pt-Reported Symptoms/Complaints: Pt admitted with Intentional drug overdose, Acute toxic Encephalopathy, acute resp failure, Apneic spells with hypercarbia, cocaine use, Fall out of bed, and Elevated CK    Pt intubated from 11/3/23 until 11/6/23 (3 days).      Predisposing dysphagia risk factors: Drug Abuse  Clinical signs of possible chronic dysphagia: hx of PEG/TF  Precipitating dysphagia risk factors: recent intubation    Vitals/labs:     Vitals:    11/07/23 0400 11/07/23 0500 11/07/23 0600 11/07/23 0700   BP: (!)

## 2023-11-07 NOTE — PROGRESS NOTES
Speech-Language Pathology  Swallowing Disorders and Dysphagia     SLP Attempt Note           Name: Yogi Benitez  : 2000  Medical Diagnosis: Delirium [R41.0]  Suicide attempt ([address]) [place]  Acute encephalopathy [G93.40]  Non-traumatic rhabdomyolysis [M62.82]  Overdose of undetermined intent, initial encounter [T50.904A]  Drug overdose, intentional self-harm, sequela ([address]) [T50.902S]      SLP attempted to perform bedside evaluation on pt this afternoon, however RN asked for eval to be postponed until pt is appropriate. SLP will re-attempt tomorrow, . No charges filed.      Thank you,     Jaclyn Carreno  SLP  Clinician    Supervisor: Skyla Paredes MA [facility]-SLP      Skyla Paredes M.A., [facility] #[id number]  Speech-Language Pathologist  [email or web address] Phone (inpatient): [phone]  Speech Desk (outpatient)- [phone]    Certified to provide:  FEES, MBS, [employer], and [place] Dysphagia Therapy Program (MDTP)

## 2023-11-07 NOTE — PROGRESS NOTES
for input(s): \"BNP\" in the last 72 hours. PT/INR: No results for input(s): \"PROTIME\", \"INR\" in the last 72 hours. APTT: No results for input(s): \"APTT\" in the last 72 hours. CARDIAC ENZYMES: No results for input(s): \"CKMB\", \"CKMBINDEX\", \"TROPONINI\" in the last 72 hours. Invalid input(s): \"CKTOTAL;3\"  FASTING LIPID PANEL:No results found for: \"CHOL\", \"HDL\", \"TRIG\"  LIVER PROFILE:   No results for input(s): \"AST\", \"ALT\", \"ALB\", \"BILIDIR\", \"BILITOT\", \"ALKPHOS\" in the last 72 hours. Cultures  Results       Procedure Component Value Units Date/Time    Culture, Respiratory [[id number]]  (Abnormal) Collected: 11/05/23 1757    Order Status: Completed Specimen: Endotracheal Updated: 11/06/23 1333     CULTURE, RESPIRATORY Moderate growth normal respiratory ryne with     Gram Stain Result 4+ WBC's (Polymorphonuclear)  2+ Gram positive cocci   1+ Epithelial Cells       Organism Staphylococcus aureus     CULTURE, RESPIRATORY --     Moderate growth  isolation in progress      Narrative:      ORDER#: [id number]                          ORDERED BY: Tae Farias  SOURCE: Endotracheal                       COLLECTED:  11/05/23 17:57  ANTIBIOTICS AT RAMON.:                      RECEIVED :  11/05/23 20:02    Culture, Blood 2 [[id number]] Collected: 11/05/23 1652    Order Status: Completed Specimen: Blood Updated: 11/06/23 1715     Culture, Blood 2 No Growth to date. Any change in status will be called. Narrative:      ORDER#: [id number]                          ORDERED BY: Tae Farias  SOURCE: Blood Hand, Right                  COLLECTED:  11/05/23 16:52  ANTIBIOTICS AT RAMON.:                      RECEIVED :  11/05/23 16:56  If child <=2 yrs old please draw pediatric bottle. ~Blood Culture #2    Culture, Blood 1 [[id number]] Collected: 11/05/23 1648    Order Status: Completed Specimen: Blood Updated: 11/06/23 1715     Blood Culture, Routine No Growth to date. Any change in status will be called.     Narrative: tachycardic   Iv metoprolol  Dc steroids     # Fevers  # Likely asp PNA  - CXR showed new infiltrates   Staph aureus in culture   -  continue unasyn      #Cocaine use   -recommend cessation      #Fall out of bed  -CT head negative      #Elevated CK   Non traumatic rhabdomyolysis  - sec to agitation   -received multiple IVF boluses      Full Code  Diet NPO Exceptions are: Ice Chips, Sips of Water with Meds  - Lovenox for DVT prophylaxis          Filiberto Chapman MD   11/7/2023 8:22 AM

## 2023-11-07 NOTE — PROGRESS NOTES
Comprehensive Nutrition Assessment    Type and Reason for Visit:  Reassess    Nutrition Recommendations/Plan:   Continue NPO status until patient is medically cleared to receive nutrition therapy. Monitor mental status and for diet advancement. Please re-check patient's weight since his weight was documented as 61.6 kg upon admission and CBW is 55 kg (-10.7% weight loss x 4 days admission). Monitor nutrition-related labs, bowel function, and weight trends. Malnutrition Assessment:  Malnutrition Status:   At risk for malnutrition (11/07/23 1351)    Context:  Acute Illness     Findings of the 6 clinical characteristics of malnutrition:  Energy Intake:  Mild decrease in energy intake   Weight Loss:  Unable to assess (weight vary significantly between admission and CBW)     Body Fat Loss:  Unable to assess     Muscle Mass Loss:  Unable to assess    Fluid Accumulation:  No significant fluid accumulation     Strength:  Not Performed    Nutrition Assessment:    patient is declining from a nutritional standpoint AEB altered mental status and inability to safely consume po nutrition at this time and he remains at risk for futher compromise d/t NPO status and altered nutrition-related labs; will continue NPO status and monitor nutrition plan of care    Nutrition Related Findings:    patient was extubated on 11/6/23; he is agitated, pleasantly confused, tearful , and sleepy; patient was yelling out this am during rounds; LR at 100 ml/hr; patient was saying that he wanted to eat this am but he cannot safely consume po nutrition at this time Wound Type: None       Current Nutrition Intake & Therapies:    Average Meal Intake: NPO  Average Supplements Intake: NPO  Diet NPO Exceptions are: Ice Chips, Sips of Water with Meds    Anthropometric Measures:  Height: 177.8 cm (5' 10\")  Ideal Body Weight (IBW): 166 lbs (75 kg)    Admission Body Weight: 61.6 kg (135 lb 12.9 oz) (obtained on 11/4/23; actual weight)  Current

## 2023-11-07 NOTE — PROGRESS NOTES
Bedside report and Pt care transferred to [facility] RN. Pt denies any assistance at this time.

## 2023-11-07 NOTE — PROGRESS NOTES
ID: 28yo admitted to the ICU after intentionally overdosing on 6000mg or so of Lamictal.      Extubated yesterday. Last 24-36 hrs received precedex, propofol, versed, ativan, and Zyprexa. Sedated this afternoon. Spoke with nurse. Dx: Delirium. Recommendations:  -  consider IV Haldol to treat delirium and reduce agitation. IV is far less likely to cause EPS and other side effects. Start 2.5mg TID with 3-4 additional doses prn. Will follow and assist in titrating the scheduled and prn doses depending on response. Ok to increase as needed. -  reduce deliriogenic medication as much as possible   -  transfer to psychiatry once medically stable.    -  continue constant observation

## 2023-11-07 NOTE — PROGRESS NOTES
1500   BP: (!) 157/99   Pulse: (!) 107   Resp: (!) 36   Temp:    SpO2: 98%       Intake/Output last 3 shifts:  I/O last 3 completed shifts: In: 6835.8 [I.V.:4494.5; NG/GT:2142; IV Piggyback:199.3]  Out: 4750 [Urine:4750]    Intake/Output this shift:  I/O this shift:  In: -   Out: 850 [Urine:850]    Yesterday's Weight:  Wt Readings from Last 1 Encounters:   11/07/23 55 kg (121 lb 4.1 oz)       SUBJECTIVE  The patient remains combative. ROS:  Negative except in subjective. Neurological examination:  MENTAL STATUS: Awake but disoriented to time, place and person. LANG/SPEECH: No dysarthria. CRANIAL NERVES: No facial asymmetry. MOTOR: No pronator drift or leg drift. REFLEXES: Generalized 2+. SENSORY: Grossly intact. COORD: No tremor. Labs and Imaging:  I reviewed labs and brain imaging. 50 minutes were spent with the patient with greater than 50% of the time spent in counseling and coordination of care.     Blaire Diggs MD

## 2023-11-07 NOTE — PLAN OF CARE
Problem: Confusion  Goal: Confusion, delirium, dementia, or psychosis is improved or at baseline  Description: INTERVENTIONS:  1. Assess for possible contributors to thought disturbance, including medications, impaired vision or hearing, underlying metabolic abnormalities, dehydration, psychiatric diagnoses, and notify attending LIP  2. [place] high risk fall precautions, as indicated  3. Provide frequent short contacts to provide reality reorientation, refocusing and direction  4. Decrease environmental stimuli, including noise as appropriate  5. Monitor and intervene to maintain adequate nutrition, hydration, elimination, sleep and activity  6. If unable to ensure safety without constant attention obtain sitter and review sitter guidelines with assigned personnel  7.  Initiate Psychosocial CNS and Spiritual Care consult, as indicated  11/7/2023 0616 by Gretchen Case RN  Outcome: Not Progressing  11/7/2023 0614 by Gretchen Case RN  Outcome: Not Progressing  Flowsheets  Taken 11/7/2023 0353  Effect of thought disturbance (confusion, delirium, dementia, or psychosis) are managed with adequate functional status: Decrease environmental stimuli, including noise as appropriate  Taken 11/6/2023 2000  Effect of thought disturbance (confusion, delirium, dementia, or psychosis) are managed with adequate functional status:   Decrease environmental stimuli, including noise as appropriate   [place] high risk fall precautions, as indicated   Assess for contributors to thought disturbance, including medications, impaired vision or hearing, underlying metabolic abnormalities, dehydration, psychiatric diagnoses, notify LIP

## 2023-11-07 NOTE — PROGRESS NOTES
11/7/23 MEETS for Device with Indicators:     Indwelling Cath   Per Dr Pat Diallo 11/6/23      Fluid Volume Managament for rhabdomyolysis per Dr Francisco Walter 11/6/23

## 2023-11-08 LAB
ANION GAP SERPL CALCULATED.3IONS-SCNC: 8 MMOL/L (ref 3–16)
BASOPHILS # BLD: 0 K/UL (ref 0–0.2)
BASOPHILS NFR BLD: 0.2 %
BUN SERPL-MCNC: 11 MG/DL (ref 7–20)
CALCIUM SERPL-MCNC: 8.6 MG/DL (ref 8.3–10.6)
CHLORIDE SERPL-SCNC: 101 MMOL/L (ref 99–110)
CK SERPL-CCNC: 4735 U/L (ref 39–308)
CO2 SERPL-SCNC: 22 MMOL/L (ref 21–32)
CREAT SERPL-MCNC: <0.5 MG/DL (ref 0.9–1.3)
DEPRECATED RDW RBC AUTO: 12.8 % (ref 12.4–15.4)
EOSINOPHIL # BLD: 0 K/UL (ref 0–0.6)
EOSINOPHIL NFR BLD: 0.5 %
GFR SERPLBLD CREATININE-BSD FMLA CKD-EPI: >60 ML/MIN/{1.73_M2}
GLUCOSE BLD-MCNC: 107 MG/DL (ref 70–99)
GLUCOSE BLD-MCNC: 71 MG/DL (ref 70–99)
GLUCOSE BLD-MCNC: 78 MG/DL (ref 70–99)
GLUCOSE BLD-MCNC: 83 MG/DL (ref 70–99)
GLUCOSE SERPL-MCNC: 91 MG/DL (ref 70–99)
HCT VFR BLD AUTO: 37.5 % (ref 40.5–52.5)
HGB BLD-MCNC: 12.5 G/DL (ref 13.5–17.5)
LYMPHOCYTES # BLD: 1.5 K/UL (ref 1–5.1)
LYMPHOCYTES NFR BLD: 20.5 %
MAGNESIUM SERPL-MCNC: 2 MG/DL (ref 1.8–2.4)
MCH RBC QN AUTO: 31.2 PG (ref 26–34)
MCHC RBC AUTO-ENTMCNC: 33.5 G/DL (ref 31–36)
MCV RBC AUTO: 93.2 FL (ref 80–100)
MONOCYTES # BLD: 0.5 K/UL (ref 0–1.3)
MONOCYTES NFR BLD: 6.3 %
NEUTROPHILS # BLD: 5.4 K/UL (ref 1.7–7.7)
NEUTROPHILS NFR BLD: 72.5 %
PERFORMED ON: ABNORMAL
PERFORMED ON: NORMAL
PLATELET # BLD AUTO: 112 K/UL (ref 135–450)
PLATELET BLD QL SMEAR: ABNORMAL
PMV BLD AUTO: 8.9 FL (ref 5–10.5)
POTASSIUM SERPL-SCNC: 3.3 MMOL/L (ref 3.5–5.1)
POTASSIUM SERPL-SCNC: 4.1 MMOL/L (ref 3.5–5.1)
RBC # BLD AUTO: 4.02 M/UL (ref 4.2–5.9)
SLIDE REVIEW: ABNORMAL
SODIUM SERPL-SCNC: 131 MMOL/L (ref 136–145)
WBC # BLD AUTO: 7.4 K/UL (ref 4–11)

## 2023-11-08 PROCEDURE — 6360000002 HC RX W HCPCS: Performed by: INTERNAL MEDICINE

## 2023-11-08 PROCEDURE — 82550 ASSAY OF CK (CPK): CPT

## 2023-11-08 PROCEDURE — 85025 COMPLETE CBC W/AUTO DIFF WBC: CPT

## 2023-11-08 PROCEDURE — A4216 STERILE WATER/SALINE, 10 ML: HCPCS | Performed by: INTERNAL MEDICINE

## 2023-11-08 PROCEDURE — 83735 ASSAY OF MAGNESIUM: CPT

## 2023-11-08 PROCEDURE — 80175 DRUG SCREEN QUAN LAMOTRIGINE: CPT

## 2023-11-08 PROCEDURE — 84132 ASSAY OF SERUM POTASSIUM: CPT

## 2023-11-08 PROCEDURE — 2500000003 HC RX 250 WO HCPCS: Performed by: INTERNAL MEDICINE

## 2023-11-08 PROCEDURE — 99233 SBSQ HOSP IP/OBS HIGH 50: CPT | Performed by: INTERNAL MEDICINE

## 2023-11-08 PROCEDURE — 6360000002 HC RX W HCPCS: Performed by: PSYCHIATRY & NEUROLOGY

## 2023-11-08 PROCEDURE — 95816 EEG AWAKE AND DROWSY: CPT | Performed by: PSYCHIATRY & NEUROLOGY

## 2023-11-08 PROCEDURE — 2580000003 HC RX 258: Performed by: INTERNAL MEDICINE

## 2023-11-08 PROCEDURE — 99233 SBSQ HOSP IP/OBS HIGH 50: CPT | Performed by: PSYCHIATRY & NEUROLOGY

## 2023-11-08 PROCEDURE — 2000000000 HC ICU R&B

## 2023-11-08 PROCEDURE — 99232 SBSQ HOSP IP/OBS MODERATE 35: CPT | Performed by: PSYCHIATRY & NEUROLOGY

## 2023-11-08 PROCEDURE — 95816 EEG AWAKE AND DROWSY: CPT

## 2023-11-08 PROCEDURE — 36415 COLL VENOUS BLD VENIPUNCTURE: CPT

## 2023-11-08 PROCEDURE — 80048 BASIC METABOLIC PNL TOTAL CA: CPT

## 2023-11-08 RX ORDER — HALOPERIDOL 5 MG/ML
2.5 INJECTION INTRAMUSCULAR EVERY 4 HOURS PRN
Status: DISCONTINUED | OUTPATIENT
Start: 2023-11-08 | End: 2023-11-08

## 2023-11-08 RX ORDER — DEXTROSE MONOHYDRATE, SODIUM CHLORIDE, SODIUM LACTATE, POTASSIUM CHLORIDE, CALCIUM CHLORIDE 5; 600; 310; 179; 20 G/100ML; MG/100ML; MG/100ML; MG/100ML; MG/100ML
INJECTION, SOLUTION INTRAVENOUS CONTINUOUS
Status: DISCONTINUED | OUTPATIENT
Start: 2023-11-08 | End: 2023-11-08 | Stop reason: SDUPTHER

## 2023-11-08 RX ORDER — HALOPERIDOL 5 MG/ML
5 INJECTION INTRAMUSCULAR
Status: DISCONTINUED | OUTPATIENT
Start: 2023-11-08 | End: 2023-11-09 | Stop reason: HOSPADM

## 2023-11-08 RX ORDER — HALOPERIDOL 5 MG/ML
5 INJECTION INTRAMUSCULAR 4 TIMES DAILY PRN
Status: DISCONTINUED | OUTPATIENT
Start: 2023-11-08 | End: 2023-11-09 | Stop reason: HOSPADM

## 2023-11-08 RX ADMIN — SODIUM CHLORIDE, POTASSIUM CHLORIDE, SODIUM LACTATE AND CALCIUM CHLORIDE: 600; 310; 30; 20 INJECTION, SOLUTION INTRAVENOUS at 06:11

## 2023-11-08 RX ADMIN — LORAZEPAM 2 MG: 2 INJECTION INTRAMUSCULAR; INTRAVENOUS at 02:15

## 2023-11-08 RX ADMIN — Medication 1.6 MCG/KG/HR: at 02:23

## 2023-11-08 RX ADMIN — SODIUM CHLORIDE, PRESERVATIVE FREE 10 ML: 5 INJECTION INTRAVENOUS at 08:46

## 2023-11-08 RX ADMIN — AMPICILLIN SODIUM AND SULBACTAM SODIUM 3000 MG: 2; 1 INJECTION, POWDER, FOR SOLUTION INTRAMUSCULAR; INTRAVENOUS at 17:37

## 2023-11-08 RX ADMIN — AMPICILLIN SODIUM AND SULBACTAM SODIUM 3000 MG: 2; 1 INJECTION, POWDER, FOR SOLUTION INTRAMUSCULAR; INTRAVENOUS at 04:01

## 2023-11-08 RX ADMIN — POTASSIUM CHLORIDE: 2 INJECTION, SOLUTION, CONCENTRATE INTRAVENOUS at 21:00

## 2023-11-08 RX ADMIN — HALOPERIDOL LACTATE 5 MG: 5 INJECTION, SOLUTION INTRAMUSCULAR at 05:19

## 2023-11-08 RX ADMIN — HALOPERIDOL LACTATE 5 MG: 5 INJECTION, SOLUTION INTRAMUSCULAR at 01:30

## 2023-11-08 RX ADMIN — AMPICILLIN SODIUM AND SULBACTAM SODIUM 3000 MG: 2; 1 INJECTION, POWDER, FOR SOLUTION INTRAMUSCULAR; INTRAVENOUS at 11:09

## 2023-11-08 RX ADMIN — LORAZEPAM 2 MG: 2 INJECTION INTRAMUSCULAR; INTRAVENOUS at 03:49

## 2023-11-08 RX ADMIN — Medication 1 MCG/KG/HR: at 07:14

## 2023-11-08 RX ADMIN — POTASSIUM CHLORIDE 10 MEQ: 7.46 INJECTION, SOLUTION INTRAVENOUS at 12:06

## 2023-11-08 RX ADMIN — HALOPERIDOL LACTATE 5 MG: 5 INJECTION, SOLUTION INTRAMUSCULAR at 18:48

## 2023-11-08 RX ADMIN — POTASSIUM CHLORIDE: 2 INJECTION, SOLUTION, CONCENTRATE INTRAVENOUS at 11:20

## 2023-11-08 RX ADMIN — AMPICILLIN SODIUM AND SULBACTAM SODIUM 3000 MG: 2; 1 INJECTION, POWDER, FOR SOLUTION INTRAMUSCULAR; INTRAVENOUS at 22:20

## 2023-11-08 RX ADMIN — HALOPERIDOL LACTATE 2.5 MG: 5 INJECTION, SOLUTION INTRAMUSCULAR at 10:41

## 2023-11-08 RX ADMIN — HALOPERIDOL LACTATE 5 MG: 5 INJECTION, SOLUTION INTRAMUSCULAR at 15:35

## 2023-11-08 RX ADMIN — SODIUM CHLORIDE, PRESERVATIVE FREE 10 ML: 5 INJECTION INTRAVENOUS at 20:30

## 2023-11-08 RX ADMIN — HALOPERIDOL LACTATE 5 MG: 5 INJECTION, SOLUTION INTRAMUSCULAR at 13:46

## 2023-11-08 RX ADMIN — Medication 0.9 MCG/KG/HR: at 15:10

## 2023-11-08 RX ADMIN — ENOXAPARIN SODIUM 40 MG: 100 INJECTION SUBCUTANEOUS at 08:42

## 2023-11-08 RX ADMIN — POTASSIUM CHLORIDE 10 MEQ: 7.46 INJECTION, SOLUTION INTRAVENOUS at 08:51

## 2023-11-08 RX ADMIN — POTASSIUM CHLORIDE 10 MEQ: 7.46 INJECTION, SOLUTION INTRAVENOUS at 09:38

## 2023-11-08 RX ADMIN — POTASSIUM CHLORIDE 10 MEQ: 7.46 INJECTION, SOLUTION INTRAVENOUS at 10:45

## 2023-11-08 RX ADMIN — LORAZEPAM 2 MG: 2 INJECTION INTRAMUSCULAR; INTRAVENOUS at 11:03

## 2023-11-08 RX ADMIN — HALOPERIDOL LACTATE 5 MG: 5 INJECTION, SOLUTION INTRAMUSCULAR at 22:14

## 2023-11-08 ASSESSMENT — PAIN SCALES - GENERAL
PAINLEVEL_OUTOF10: 0

## 2023-11-08 NOTE — PROGRESS NOTES
Neurology Progress Note    ID: Anjum Rivas is a 23 y.o. male    : 2000     LOS: 5 days     ASSESSMENT    Principal Problem:    Acute encephalopathy  Active Problems:    Intentional drug overdose ([address])    Suicide attempt ([address])    Elevated CK    Apnea    Acute respiratory failure with hypoxia (HCC)  Resolved Problems:    * No resolved hospital problems. *    The patient remains combative. The patient received multiple doses of benzodiazepine. CK is trending down. CT brain showed no acute injury. Lamotrigine level was supratherapeutic. Follow-up lamotrigine level is pending. CT brain showed no acute injury. UDS is positive for cannabinoids and cocaine. From neurology perspective, this is likely toxic/metabolic event. PLAN    1. Haloperidol per psychiatry plan. 2.  Ketamine 60 mg as needed once if the patient failed haloperidol. 3.  Trending CK. 4.  Maintain hydration. 5.  EEG. Medications:  Scheduled Meds:    ampicillin-sulbactam  3,000 mg IntraVENous Q6H    sodium chloride flush  10 mL IntraVENous 2 times per day    nicotine  1 patch TransDERmal Daily    enoxaparin  40 mg SubCUTAneous Daily     Continuous Infusions:    dexmedetomidine HCl in NaCl 1.1 mcg/kg/hr (23 0827)    sodium chloride 5 mL/hr at 23 0159    dextrose       PRN Meds: haloperidol lactate, ketamine, LORazepam **OR** LORazepam **OR** LORazepam **OR** LORazepam **OR** LORazepam **OR** LORazepam **OR** LORazepam **OR** LORazepam, sodium chloride flush, sodium chloride, potassium chloride, magnesium sulfate, promethazine **OR** ondansetron, acetaminophen **OR** acetaminophen, fentanNYL, glucose, dextrose bolus **OR** dextrose bolus, glucagon (rDNA), dextrose    OBJECTIVE  Vital signs in the last 24 hours:  Vitals:    23 1000   BP: 134/89   Pulse: (!) 49   Resp: 21   Temp:    SpO2: 96%       Intake/Output last 3 shifts:  I/O last 3 completed shifts:   In: 7515.7 [I.V.:6755.3; IV Piggyback:760.4]  Out: 6500 [Urine:6500]    Intake/Output this shift:  I/O this shift:  In: 253.1 [I.V.:253.1]  Out: 440 [Urine:440]    Yesterday's Weight:  Wt Readings from Last 1 Encounters:   11/08/23 55 kg (121 lb 4.1 oz)       SUBJECTIVE  The patient remains combative. ROS:  Negative except in subjective. Neurological examination:  MENTAL STATUS: Awake but disoriented to time, place and person. LANG/SPEECH: No dysarthria. CRANIAL NERVES: No facial asymmetry. MOTOR: No pronator drift or leg drift. REFLEXES: Generalized 2+. SENSORY: Grossly intact. COORD: No tremor. Labs and Imaging:  I reviewed labs and brain imaging. 50 minutes were spent with the patient with greater than 50% of the time spent in counseling and coordination of care.     Moreno Howell MD

## 2023-11-08 NOTE — PROGRESS NOTES
Care rounds completed with Dr. Katelin Taylor and multidisciplinary team. Reviewed labs, meds, VS, assessment, & plan of care for today. See dictated note and new orders for details.      Wean down/off precedex using haldol and/or ativan  Verify with psych the mediation plan  DC LR  Start D5 LR with 20 meq KCl at 100 ml/hr

## 2023-11-08 NOTE — PROGRESS NOTES
Bedside report and Pt care transferred to [facility]. Pt denies any assistance at this time.

## 2023-11-08 NOTE — PROGRESS NOTES
Speech-Language Pathology  Swallowing Disorders and Dysphagia     SLP Attempt Note           Name: Gabriela Mendoza  : 2000  Medical Diagnosis: Delirium [R41.0]  Suicide attempt ([address]) Chidi Morales  Acute encephalopathy [G93.40]  Non-traumatic rhabdomyolysis [M62.82]  Overdose of undetermined intent, initial encounter [T50.904A]  Drug overdose, intentional self-harm, sequela ([address]) [T50.902S]    Attempted to see pt for swallow eval. Pt noted with consistent doses of precedex and haldol, and ativan. Dx of delirium. Will re-attempt tomorrow and if not appropriate, will sign off. No charges filed.  Thank you,    Marbella Jasso M.A., [address] [facility] #[id number]  Speech-Language Pathologist  [facility] Phone (inpatient): [phone]  Speech Desk (outpatient)- [phone]    Certified to provide:  FEES, MBS, [employer], and [facility]ph[facility] (MDTP)

## 2023-11-08 NOTE — PROGRESS NOTES
Physical and Occupational Therapy    Orders received and chart reviewed. Per RN pt is on hold again this date. Will sign off on orders at this time as PT/OT have attempted for the past 3 days. Please re consult when appropriate.     Thanks  Brianna Holloway, PT, DPT PT [id number]  Cherelle Tolentino, OTR/L #[id number]

## 2023-11-08 NOTE — PROGRESS NOTES
Shift assessment, completed, see flow sheet. Pt RASS -1 to +1. Pt restless trying to pull at catheter, able to follow a few simple commands    SB 54, /95 (109), SpO2 94% on RA. Respirations are easy, even, and unlabored. Bilateral lung sounds clear/diminished. PIV, WNL with precedex 1.1 mcg/kg/hr infusing. Case in place and patent with STAT lock. Sitter at bedside for pt safety. Call light within reach. Bed in lowest position. Bed alarm on.

## 2023-11-08 NOTE — PROGRESS NOTES
ID: 32 admitted to the ICU after intentionally overdosing on 6000mg or so of Lamictal.      Extubated two days ago. Continues to received precedex. 6mg of ativan and 22.5mg of Haldol in the last 24hs. Sedated this afternoon but able to respond to me. Told me he got  yesterday. Spoke with nurse and Dr. Farrah Kang    Dx: Delirium. Plan:  - schedule Haldol 5mg IV OD and 5mg IV QID prn agitation. - discontinue ativan. - work on weaning off precedex. - will follow.

## 2023-11-08 NOTE — PROGRESS NOTES
Reassessment completed, see flowsheets, unchanged from prior. VSS. Precedex 0.9 mcg/kg/hr. Pt continues to be agitated/restless at times, trying to get out of bed, pulling at arreaga catheter, yelling. Pt can be calmed down by talking to him, although he does not understand what you are saying, he will settle down temporarily. All ICU Lines and monitoring remain in place. Bed locked in lowest position. Call light within reach. Sitter remains at bedside.

## 2023-11-08 NOTE — PROGRESS NOTES
Reassessment completed, see flowsheets, unchanged from prior. VSS. Pt received first scheduled dose of haldol and x1 prn. Precedex was at 1.1 and back to 0.9 mcg/kg/hr. Pt continues to be agitated, verbally abusive, attempting to get out of bed, cussing at staff. Pt will settle down for a minute or two at at time. All ICU Lines and monitoring remain in place. Bed locked in lowest position. Call light within reach. Sitter remains at bedside.

## 2023-11-08 NOTE — PROGRESS NOTES
Pt agitated yelling out \"call the police, they are raping him, you are raping me\" and grabbing for his arreaga catheter. Sitter and RN at bedside able to defuse patient with talking and warm blankets. Precedex remains at 1.1 mcg/kg/hr. Pt resting quietly with arms on stomach.        Potassium 3.3, first of 4, 10 meq KCl initiated

## 2023-11-08 NOTE — PROGRESS NOTES
Blood Hand, Right                  COLLECTED:  11/05/23 16:48  ANTIBIOTICS AT [facility].:                      RECEIVED :  11/05/23 16:56  If child <=2 yrs old please draw pediatric bottle. ~Blood Culture 1              Radiology  XR CHEST PORTABLE   Final Result   Mild left basilar atelectasis or airspace disease, similar to prior. XR CHEST PORTABLE   Final Result   Developing infiltrates in the mid to lower left lung field. XR CHEST PORTABLE   Final Result   No acute disease         XR CHEST PORTABLE   Final Result   No acute pulmonary process. CT HEAD WO CONTRAST   Final Result   No acute intracranial abnormality. Tele- sinus tach- Q tc normal     Labs and imaging reviewed     Assessment:  Principal Problem:    Acute encephalopathy  Active Problems:    Intentional drug overdose (HCC)    Suicide attempt ([address])    Elevated CK    Apnea    Acute respiratory failure with hypoxia (HCC)  Resolved Problems:    * No resolved hospital problems. *      Plan:    #Intentional drug overdose   # Suicidal intent   # Acute toxic Encephalopathy  -overdosed on  lamictal   -Drug screen - cocaine and MJ  -poison control was contacted,recommended observation but pt got restless and agitated needing several doses of IV meds for  agitation   -Subsequently intubated  -suicide precautions ; needs sitter at bedside once extubated.  -psychiatry consulted - Plan to go to [facility] once medically stable   Extubated.  Now on 3 L of O2  Ativan IV prn  Precedex drip- -wean and dc   Haldol prn  Continue IVF   Neuro and psych following      # Acute resp failure    # Apneic spells with hypercarbia - likely with sedative meds and toxic encephalopathy  - intubated, now placed on vent   - sedation with fentanyl gtt, propofol gtt and prn versed  - imaging neg for acute findings on xray  - supportive care   - weaning in 24 hrs when appropriate  Decadron   Extubated  Dc steroids   On RA     Elevated CK  Continue IVF     # Fevers  # Likely asp PNA  - CXR showed new infiltrates   Staph aureus and step pneumo  in culture   -  continue unasyn      #Cocaine use   -recommend cessation      #Fall out of bed  -CT head negative      #Elevated CK   Non traumatic rhabdomyolysis  - sec to agitation   -received multiple IVF boluses      Full Code  Diet NPO Exceptions are: Ice Chips, Sips of Water with Meds  - Lovenox for DVT prophylaxis          Angela Kincaid MD   11/8/2023 8:05 AM

## 2023-11-08 NOTE — PROGRESS NOTES
EEG tech at bedside. Goal is that pt will stay still for study. Gave scheduled haldol, will wait until EEG is completed before decreasing precedex.

## 2023-11-08 NOTE — PLAN OF CARE
Problem: Self Harm/Suicidality  Goal: Will have no self-injury during hospital stay  Description: INTERVENTIONS:  1. Ensure constant observer at bedside with Q15M safety checks  2. Maintain a safe environment  3. Secure patient belongings  4. Ensure family/visitors adhere to safety recommendations  5. Ensure safety tray has been added to patient's diet order  6.   Every shift and PRN: Re-assess suicidal risk via Frequent Screener    11/8/2023 1319 by Adolfo Kraft RN  Outcome: Progressing  Flowsheets (Taken 11/8/2023 1319)  Will have no self-injury during hospital stay:   Ensure constant observer at bedside with Q15M safety checks   Maintain a safe environment   Secure patient belongings   Every shift and PRN: Re-assess suicidal risk via Frequent Screener

## 2023-11-08 NOTE — PLAN OF CARE
Problem: Discharge Planning  Goal: Discharge to home or other facility with appropriate resources  Outcome: Progressing     Problem: Risk for Elopement  Goal: Patient will not exit the unit/facility without proper excort  Outcome: Progressing     Problem: Self Harm/Suicidality  Goal: Will have no self-injury during hospital stay  Description: INTERVENTIONS:  1. Ensure constant observer at bedside with Q15M safety checks  2. Maintain a safe environment  3. Secure patient belongings  4. Ensure family/visitors adhere to safety recommendations  5. Ensure safety tray has been added to patient's diet order  6.   Every shift and PRN: Re-assess suicidal risk via Frequent Screener    Outcome: Progressing

## 2023-11-08 NOTE — PROCEDURES
INTERPRETATION:  This 25-minute, computer-assisted video EEG recording is mildly abnormal.  It showed mild degree of generalized slowing background activity. No potentially epileptiform activity was present during the recording. The EEG findings were consistent with mild degree of generalized non-specific cerebral dysfunction. CLASSIFICATION:  Dysrhythmia grade 1, generalized. Sleep - unsuccessful. EKG channel. DESCRIPTION:    BACKGROUND:  The awake recording revealed 9 Hz alpha activity over the posterior head region. There were increased 4 to 7 Hz theta activity into the EEG background. Given the extensive study, the patient still did not sleep. The EEG showed normal V waves during drowsiness. There was no significant change on the EEG background with photic stimulation. Hyperventilation was omitted due to the patient was not cooperative. INTERICTAL DISCHARGES: None    CLINICAL EVENTS:  None    The EKG channel was unremarkable.

## 2023-11-09 ENCOUNTER — HOSPITAL ENCOUNTER (INPATIENT)
Age: 23
LOS: 4 days | Discharge: HOME | DRG: 885 | End: 2023-11-13
Attending: PSYCHIATRY & NEUROLOGY | Admitting: PSYCHIATRY & NEUROLOGY
Payer: COMMERCIAL

## 2023-11-09 VITALS
WEIGHT: 107.36 LBS | SYSTOLIC BLOOD PRESSURE: 115 MMHG | RESPIRATION RATE: 22 BRPM | HEIGHT: 70 IN | BODY MASS INDEX: 15.37 KG/M2 | DIASTOLIC BLOOD PRESSURE: 80 MMHG | HEART RATE: 63 BPM | TEMPERATURE: 98.2 F | OXYGEN SATURATION: 98 %

## 2023-11-09 PROBLEM — F32.A DEPRESSION, UNSPECIFIED: Status: ACTIVE | Noted: 2023-11-09

## 2023-11-09 LAB
ANION GAP SERPL CALCULATED.3IONS-SCNC: 10 MMOL/L (ref 3–16)
BACTERIA BLD CULT ORG #2: NORMAL
BACTERIA BLD CULT: NORMAL
BASOPHILS # BLD: 0.2 K/UL (ref 0–0.2)
BASOPHILS NFR BLD: 5.2 %
BUN SERPL-MCNC: 8 MG/DL (ref 7–20)
CALCIUM SERPL-MCNC: 9.7 MG/DL (ref 8.3–10.6)
CHLORIDE SERPL-SCNC: 104 MMOL/L (ref 99–110)
CK SERPL-CCNC: 1449 U/L (ref 39–308)
CO2 SERPL-SCNC: 25 MMOL/L (ref 21–32)
CREAT SERPL-MCNC: 0.6 MG/DL (ref 0.9–1.3)
DEPRECATED RDW RBC AUTO: 13.1 % (ref 12.4–15.4)
EOSINOPHIL # BLD: 0.1 K/UL (ref 0–0.6)
EOSINOPHIL NFR BLD: 1.5 %
GFR SERPLBLD CREATININE-BSD FMLA CKD-EPI: >60 ML/MIN/{1.73_M2}
GLUCOSE BLD-MCNC: 100 MG/DL (ref 70–99)
GLUCOSE BLD-MCNC: 80 MG/DL (ref 70–99)
GLUCOSE BLD-MCNC: 86 MG/DL (ref 70–99)
GLUCOSE BLD-MCNC: 94 MG/DL (ref 70–99)
GLUCOSE SERPL-MCNC: 99 MG/DL (ref 70–99)
HCT VFR BLD AUTO: 47.2 % (ref 40.5–52.5)
HGB BLD-MCNC: 16.4 G/DL (ref 13.5–17.5)
LYMPHOCYTES # BLD: 0.5 K/UL (ref 1–5.1)
LYMPHOCYTES NFR BLD: 12 %
MCH RBC QN AUTO: 31.3 PG (ref 26–34)
MCHC RBC AUTO-ENTMCNC: 34.8 G/DL (ref 31–36)
MCV RBC AUTO: 89.8 FL (ref 80–100)
MONOCYTES # BLD: 0.3 K/UL (ref 0–1.3)
MONOCYTES NFR BLD: 7.3 %
NEUTROPHILS # BLD: 3.4 K/UL (ref 1.7–7.7)
NEUTROPHILS NFR BLD: 74 %
PERFORMED ON: ABNORMAL
PERFORMED ON: NORMAL
PLATELET # BLD AUTO: 231 K/UL (ref 135–450)
PMV BLD AUTO: 7.1 FL (ref 5–10.5)
POTASSIUM SERPL-SCNC: 3.7 MMOL/L (ref 3.5–5.1)
RBC # BLD AUTO: 5.25 M/UL (ref 4.2–5.9)
SODIUM SERPL-SCNC: 139 MMOL/L (ref 136–145)
WBC # BLD AUTO: 4.6 K/UL (ref 4–11)

## 2023-11-09 PROCEDURE — 36415 COLL VENOUS BLD VENIPUNCTURE: CPT

## 2023-11-09 PROCEDURE — 6360000002 HC RX W HCPCS: Performed by: INTERNAL MEDICINE

## 2023-11-09 PROCEDURE — 99238 HOSP IP/OBS DSCHRG MGMT 30/<: CPT | Performed by: INTERNAL MEDICINE

## 2023-11-09 PROCEDURE — 2580000003 HC RX 258: Performed by: INTERNAL MEDICINE

## 2023-11-09 PROCEDURE — 99233 SBSQ HOSP IP/OBS HIGH 50: CPT | Performed by: INTERNAL MEDICINE

## 2023-11-09 PROCEDURE — 82550 ASSAY OF CK (CPK): CPT

## 2023-11-09 PROCEDURE — 6360000002 HC RX W HCPCS: Performed by: PSYCHIATRY & NEUROLOGY

## 2023-11-09 PROCEDURE — 80048 BASIC METABOLIC PNL TOTAL CA: CPT

## 2023-11-09 PROCEDURE — 6370000000 HC RX 637 (ALT 250 FOR IP)

## 2023-11-09 PROCEDURE — 2500000003 HC RX 250 WO HCPCS: Performed by: INTERNAL MEDICINE

## 2023-11-09 PROCEDURE — 99233 SBSQ HOSP IP/OBS HIGH 50: CPT | Performed by: PSYCHIATRY & NEUROLOGY

## 2023-11-09 PROCEDURE — 6370000000 HC RX 637 (ALT 250 FOR IP): Performed by: INTERNAL MEDICINE

## 2023-11-09 PROCEDURE — 1240000000 HC EMOTIONAL WELLNESS R&B

## 2023-11-09 PROCEDURE — 85025 COMPLETE CBC W/AUTO DIFF WBC: CPT

## 2023-11-09 RX ORDER — MAGNESIUM HYDROXIDE/ALUMINUM HYDROXICE/SIMETHICONE 120; 1200; 1200 MG/30ML; MG/30ML; MG/30ML
30 SUSPENSION ORAL EVERY 6 HOURS PRN
Status: DISCONTINUED | OUTPATIENT
Start: 2023-11-09 | End: 2023-11-13 | Stop reason: HOSPADM

## 2023-11-09 RX ORDER — IBUPROFEN 400 MG/1
400 TABLET ORAL EVERY 6 HOURS PRN
Status: DISCONTINUED | OUTPATIENT
Start: 2023-11-09 | End: 2023-11-10

## 2023-11-09 RX ORDER — OLANZAPINE 5 MG/1
5 TABLET ORAL EVERY 4 HOURS PRN
Status: DISCONTINUED | OUTPATIENT
Start: 2023-11-09 | End: 2023-11-10

## 2023-11-09 RX ORDER — RISPERIDONE 0.5 MG/1
0.5 TABLET ORAL DAILY
Status: ON HOLD | COMMUNITY
Start: 2023-10-17 | End: 2023-11-13 | Stop reason: HOSPADM

## 2023-11-09 RX ORDER — ACETAMINOPHEN 325 MG/1
650 TABLET ORAL EVERY 4 HOURS PRN
Status: DISCONTINUED | OUTPATIENT
Start: 2023-11-09 | End: 2023-11-10

## 2023-11-09 RX ORDER — TRAZODONE HYDROCHLORIDE 50 MG/1
50 TABLET ORAL NIGHTLY PRN
Status: DISCONTINUED | OUTPATIENT
Start: 2023-11-09 | End: 2023-11-13 | Stop reason: HOSPADM

## 2023-11-09 RX ORDER — HYDROXYZINE 50 MG/1
50 TABLET, FILM COATED ORAL 3 TIMES DAILY PRN
Status: DISCONTINUED | OUTPATIENT
Start: 2023-11-09 | End: 2023-11-13 | Stop reason: HOSPADM

## 2023-11-09 RX ORDER — POLYETHYLENE GLYCOL 3350 17 G
2 POWDER IN PACKET (EA) ORAL
Status: DISCONTINUED | OUTPATIENT
Start: 2023-11-09 | End: 2023-11-13 | Stop reason: HOSPADM

## 2023-11-09 RX ORDER — AMOXICILLIN AND CLAVULANATE POTASSIUM 875; 125 MG/1; MG/1
1 TABLET, FILM COATED ORAL EVERY 12 HOURS SCHEDULED
Qty: 5 TABLET | Refills: 0 | Status: ON HOLD
Start: 2023-11-09 | End: 2023-11-09

## 2023-11-09 RX ORDER — LAMOTRIGINE 150 MG/1
300 TABLET ORAL DAILY
Status: ON HOLD | COMMUNITY
Start: 2023-10-20 | End: 2023-11-13 | Stop reason: HOSPADM

## 2023-11-09 RX ORDER — DIPHENHYDRAMINE HYDROCHLORIDE 50 MG/ML
50 INJECTION INTRAMUSCULAR; INTRAVENOUS EVERY 4 HOURS PRN
Status: DISCONTINUED | OUTPATIENT
Start: 2023-11-09 | End: 2023-11-10

## 2023-11-09 RX ORDER — AMOXICILLIN AND CLAVULANATE POTASSIUM 875; 125 MG/1; MG/1
1 TABLET, FILM COATED ORAL EVERY 12 HOURS SCHEDULED
Status: DISCONTINUED | OUTPATIENT
Start: 2023-11-09 | End: 2023-11-09 | Stop reason: HOSPADM

## 2023-11-09 RX ORDER — AMOXICILLIN AND CLAVULANATE POTASSIUM 875; 125 MG/1; MG/1
1 TABLET, FILM COATED ORAL EVERY 12 HOURS SCHEDULED
Status: COMPLETED | OUTPATIENT
Start: 2023-11-09 | End: 2023-11-11

## 2023-11-09 RX ORDER — ENOXAPARIN SODIUM 100 MG/ML
30 INJECTION SUBCUTANEOUS DAILY
Status: DISCONTINUED | OUTPATIENT
Start: 2023-11-10 | End: 2023-11-09 | Stop reason: HOSPADM

## 2023-11-09 RX ADMIN — AMOXICILLIN AND CLAVULANATE POTASSIUM 1 TABLET: 875; 125 TABLET, FILM COATED ORAL at 09:45

## 2023-11-09 RX ADMIN — AMPICILLIN SODIUM AND SULBACTAM SODIUM 3000 MG: 2; 1 INJECTION, POWDER, FOR SOLUTION INTRAMUSCULAR; INTRAVENOUS at 06:03

## 2023-11-09 RX ADMIN — HALOPERIDOL LACTATE 5 MG: 5 INJECTION, SOLUTION INTRAMUSCULAR at 06:05

## 2023-11-09 RX ADMIN — Medication 0.3 MCG/KG/HR: at 01:15

## 2023-11-09 RX ADMIN — POTASSIUM CHLORIDE: 2 INJECTION, SOLUTION, CONCENTRATE INTRAVENOUS at 07:34

## 2023-11-09 RX ADMIN — HALOPERIDOL LACTATE 5 MG: 5 INJECTION, SOLUTION INTRAMUSCULAR at 11:02

## 2023-11-09 RX ADMIN — TRAZODONE HYDROCHLORIDE 50 MG: 50 TABLET ORAL at 23:01

## 2023-11-09 RX ADMIN — AMOXICILLIN AND CLAVULANATE POTASSIUM 1 TABLET: 875; 125 TABLET, FILM COATED ORAL at 23:04

## 2023-11-09 RX ADMIN — SODIUM CHLORIDE, PRESERVATIVE FREE 10 ML: 5 INJECTION INTRAVENOUS at 11:05

## 2023-11-09 ASSESSMENT — PATIENT HEALTH QUESTIONNAIRE - PHQ9
2. FEELING DOWN, DEPRESSED OR HOPELESS: 0
SUM OF ALL RESPONSES TO PHQ QUESTIONS 1-9: 0
SUM OF ALL RESPONSES TO PHQ9 QUESTIONS 1 & 2: 0
SUM OF ALL RESPONSES TO PHQ QUESTIONS 1-9: 0
1. LITTLE INTEREST OR PLEASURE IN DOING THINGS: 0
SUM OF ALL RESPONSES TO PHQ QUESTIONS 1-9: 0
SUM OF ALL RESPONSES TO PHQ QUESTIONS 1-9: 0

## 2023-11-09 ASSESSMENT — SLEEP AND FATIGUE QUESTIONNAIRES
DO YOU USE A SLEEP AID: NO
DO YOU HAVE DIFFICULTY SLEEPING: NO
AVERAGE NUMBER OF SLEEP HOURS: 10

## 2023-11-09 ASSESSMENT — PAIN SCALES - GENERAL
PAINLEVEL_OUTOF10: 0
PAINLEVEL_OUTOF10: 0

## 2023-11-09 ASSESSMENT — LIFESTYLE VARIABLES
HOW MANY STANDARD DRINKS CONTAINING ALCOHOL DO YOU HAVE ON A TYPICAL DAY: PATIENT DOES NOT DRINK
HOW OFTEN DO YOU HAVE A DRINK CONTAINING ALCOHOL: NEVER

## 2023-11-09 NOTE — PROGRESS NOTES
Woke pt up to offer lunch and explain he will be moving later today.   Pt decline any food at this time

## 2023-11-09 NOTE — PROGRESS NOTES
Precedex stopped at 0255. Pt now alert and oriented X4. Calm and cooperative. Denies any needs.  continues to be at bedside.

## 2023-11-09 NOTE — BH NOTE
4 Eyes Skin Assessment     NAME:  Alvy Cushing  YOB: 2000  MEDICAL RECORD NUMBER:  [id number]    The patient is being assessed for  Admission    I agree that at least one RN has performed a thorough Head to Toe Skin Assessment on the patient. ALL assessment sites listed below have been assessed. Areas assessed by both nurses:    FULL PHYSICAL         Does the Patient have a Wound?  No noted wound(s)       Juwan Prevention initiated by RN: No  Wound Care Orders initiated by RN: No    Pressure Injury (Stage 3,4, Unstageable, DTI, NWPT, and Complex wounds) if present, place Wound referral order by RN under : No    New Ostomies, if present place, Ostomy referral order under : No     Nurse 1 eSignature: Electronically signed by Karen Cunningham RN on 11/9/23 at 6:57 PM EST    **SHARE this note so that the co-signing nurse can place an eSignature**    Nurse 2 eSignature: Electronically signed by Angelique Mccloud RN on 11/9/23 at 6:59 PM EST

## 2023-11-09 NOTE — BH NOTE
CSSR-S Assessment completed with patient who then scored HIGH RISK. Provider A Blease Part was notified of HIGH RISK score. Were suicide precautions ordered: NO    If not ordered, justification as follows: Pt denies current suicidality. He expresses regret regarding his recent suicide attempt. He is future oriented, with plans to see his child at discharge and spend his birthday with his child.      Completed by: Pilo Silveira RN

## 2023-11-09 NOTE — PLAN OF CARE
Problem: Discharge Planning  Goal: Discharge to home or other facility with appropriate resources  11/9/2023 1229 by Katelyn Flores RN  Outcome: Adequate for Discharge  11/9/2023 0833 by Katelyn Flores RN  Outcome: Progressing     Problem: Risk for Elopement  Goal: Patient will not exit the unit/facility without proper excort  11/9/2023 1229 by Katelyn Flores RN  Outcome: Adequate for Discharge  11/9/2023 0833 by Katelyn Flores RN  Outcome: Progressing     Problem: Safety - Medical Restraint  Goal: Remains free of injury from restraints (Restraint for Interference with Medical Device)  Description: INTERVENTIONS:  1. Determine that other, less restrictive measures have been tried or would not be effective before applying the restraint  2. Evaluate the patient's condition at the time of restraint application  3. Inform patient/family regarding the reason for restraint  4. Q2H: Monitor safety, psychosocial status, comfort, nutrition and hydration  11/9/2023 1229 by Katelyn Flores RN  Outcome: Adequate for Discharge  11/9/2023 0833 by Katelyn Flores RN  Outcome: Progressing     Problem: Safety - Adult  Goal: Free from fall injury  11/9/2023 1229 by Katelyn Flores RN  Outcome: Adequate for Discharge  11/9/2023 0833 by Katelyn Flores RN  Outcome: Progressing     Problem: Pain  Goal: Verbalizes/displays adequate comfort level or baseline comfort level  11/9/2023 1229 by Katelyn Flores RN  Outcome: Adequate for Discharge  11/9/2023 0833 by Katelyn Flores RN  Outcome: Progressing     Problem: Nutrition Deficit:  Goal: Optimize nutritional status  11/9/2023 1229 by Katelyn Flores RN  Outcome: Adequate for Discharge  11/9/2023 0833 by Katelyn Flores RN  Outcome: Progressing     Problem: Skin/Tissue Integrity  Goal: Absence of new skin breakdown  Description: 1. Monitor for areas of redness and/or skin breakdown  2. Assess vascular access sites hourly  3.   Every 4-6 hours minimum:  Change oxygen saturation probe site  4. Every 4-6 hours:  If on nasal continuous positive airway pressure, respiratory therapy assess nares and determine need for appliance change or resting period. 11/9/2023 1229 by Daria Kramer RN  Outcome: Adequate for Discharge  11/9/2023 1000 by Daria Kramer RN  Outcome: Progressing     Problem: Confusion  Goal: Confusion, delirium, dementia, or psychosis is improved or at baseline  Description: INTERVENTIONS:  1. Assess for possible contributors to thought disturbance, including medications, impaired vision or hearing, underlying metabolic abnormalities, dehydration, psychiatric diagnoses, and notify attending LIP  2. [place] high risk fall precautions, as indicated  3. Provide frequent short contacts to provide reality reorientation, refocusing and direction  4. Decrease environmental stimuli, including noise as appropriate  5. Monitor and intervene to maintain adequate nutrition, hydration, elimination, sleep and activity  6. If unable to ensure safety without constant attention obtain sitter and review sitter guidelines with assigned personnel  7. Initiate Psychosocial CNS and Spiritual Care consult, as indicated  11/9/2023 1229 by Daria Kramer RN  Outcome: Adequate for Discharge  11/9/2023 0828 by Daria Kramer RN  Outcome: Progressing     Problem: Self Harm/Suicidality  Goal: Will have no self-injury during hospital stay  Description: INTERVENTIONS:  1. Ensure constant observer at bedside with Q15M safety checks  2. Maintain a safe environment  3. Secure patient belongings  4. Ensure family/visitors adhere to safety recommendations  5. Ensure safety tray has been added to patient's diet order  6.   Every shift and PRN: Re-assess suicidal risk via Frequent Screener    11/9/2023 1229 by Daria Kramer RN  Outcome: Adequate for Discharge  11/9/2023 0833 by Daria Kramer RN  Outcome: Progressing  Goal: No signs of

## 2023-11-09 NOTE — PLAN OF CARE
Problem: Discharge Planning  Goal: Discharge to home or other facility with appropriate resources  Outcome: Progressing     Problem: Risk for Elopement  Goal: Patient will not exit the unit/facility without proper excort  Outcome: Progressing     Problem: Safety - Medical Restraint  Goal: Remains free of injury from restraints (Restraint for Interference with Medical Device)  Description: INTERVENTIONS:  1. Determine that other, less restrictive measures have been tried or would not be effective before applying the restraint  2. Evaluate the patient's condition at the time of restraint application  3. Inform patient/family regarding the reason for restraint  4. Q2H: Monitor safety, psychosocial status, comfort, nutrition and hydration  Outcome: Progressing     Problem: Safety - Adult  Goal: Free from fall injury  Outcome: Progressing     Problem: Pain  Goal: Verbalizes/displays adequate comfort level or baseline comfort level  Outcome: Progressing     Problem: Nutrition Deficit:  Goal: Optimize nutritional status  Outcome: Progressing     Problem: Skin/Tissue Integrity  Goal: Absence of new skin breakdown  Description: 1. Monitor for areas of redness and/or skin breakdown  2. Assess vascular access sites hourly  3. Every 4-6 hours minimum:  Change oxygen saturation probe site  4. Every 4-6 hours:  If on nasal continuous positive airway pressure, respiratory therapy assess nares and determine need for appliance change or resting period. Outcome: Progressing     Problem: Confusion  Goal: Confusion, delirium, dementia, or psychosis is improved or at baseline  Description: INTERVENTIONS:  1. Assess for possible contributors to thought disturbance, including medications, impaired vision or hearing, underlying metabolic abnormalities, dehydration, psychiatric diagnoses, and notify attending LIP  2. [place] high risk fall precautions, as indicated  3.  Provide frequent short contacts to provide reality

## 2023-11-09 NOTE — BH NOTE
[facility]  Admission Note     Admission Type:   Admission Type: Involuntary    Reason for admission:  Reason for Admission: suicide attempt via ingestion of 6000mg of lamictal      Addictive Behavior:   Addictive Behavior  In the Past 3 Months, Have You Felt or Has Someone Told You That You Have a Problem With  : None    Medical Problems:   Past Medical History:   Diagnosis Date    ADHD (attention deficit hyperactivity disorder)     Depression     DMDD (disruptive mood dysregulation disorder) (HCC)     OCD (obsessive compulsive disorder)        Status EXAM:  Mental Status and Behavioral Exam  Normal: No  Level of Assistance: Independent/Self  Facial Expression: Worried  Affect: Appropriate, Congruent  Level of Consciousness: Alert  Frequency of Checks: 4 times per hour, close  Mood:Normal: No  Mood: Anxious  Motor Activity:Normal: Yes  Eye Contact: Fair  Observed Behavior: Cooperative, Friendly  Sexual Misconduct History: Current - no  Preception: [place] to person, [place] to time, [place] to place, [place] to situation  Attention:Normal: Yes  Thought Processes: Unremarkable  Thought Content:Normal: Yes  Depression Symptoms: No problems reported or observed.   Anxiety Symptoms: Generalized  Mariana Symptoms: Poor judgment  Hallucinations: None  Delusions: No  Memory:Normal: No  Memory: Confabulation, Poor recent (on vent for several days)  Insight and Judgment: No  Insight and Judgment: Poor judgment, Poor insight    Tobacco Screening:  Practical Counseling, on admission, abimbola X, if applicable and completed (first 3 are required if patient doesn't refuse):            (X) Recognizing danger situations (included triggers and roadblocks)                    (X) Coping skills (new ways to manage stress,relaxation techniques, changing routine, distraction)                                                           (X) Basic information about quitting (benefits of quitting, techniques in how to quit, available

## 2023-11-09 NOTE — PROGRESS NOTES
Neurology Progress Note    ID: Priscilla Moore is a 23 y.o. male    : 2000     LOS: 6 days     ASSESSMENT    Principal Problem:    Acute encephalopathy  Active Problems:    Intentional drug overdose ([address])    Suicide attempt ([address])    Elevated CK    Apnea    Acute respiratory failure with hypoxia (HCC)  Resolved Problems:    * No resolved hospital problems. *    The patient remains combative. The patient received multiple doses of benzodiazepine. CK is trending down. CT brain showed no acute injury. Lamotrigine level was supratherapeutic. Follow-up lamotrigine level is pending. The EEG showed no evidence of seizure tendency. CT brain showed no acute injury. UDS is positive for cannabinoids and cocaine. From neurology perspective, this is likely toxic/metabolic event. The patient is more alert and oriented today. PLAN    1. Haloperidol per psychiatry plan. .  2.  Maintain hydration. 3.  Cardiac telemetry. If the lamotrigine level is trending down, there is no need for hemodialysis. Medications:  Scheduled Meds:    [START ON 11/10/2023] enoxaparin  30 mg SubCUTAneous Daily    amoxicillin-clavulanate  1 tablet Oral 2 times per day    haloperidol lactate  5 mg IntraVENous 5x Daily    sodium chloride flush  10 mL IntraVENous 2 times per day    nicotine  1 patch TransDERmal Daily     Continuous Infusions:    sodium chloride 5 mL/hr at 23 0159    dextrose       PRN Meds: haloperidol lactate, sodium chloride flush, sodium chloride, potassium chloride, magnesium sulfate, promethazine **OR** ondansetron, acetaminophen **OR** acetaminophen, glucose, dextrose bolus **OR** dextrose bolus, glucagon (rDNA), dextrose    OBJECTIVE  Vital signs in the last 24 hours:  Vitals:    23 1300   BP:    Pulse: 73   Resp: 18   Temp:    SpO2: 96%       Intake/Output last 3 shifts:  I/O last 3 completed shifts:   In: 6511.8 [I.V.:5056.7; IV Piggyback:1455.1]  Out: 8290 [Urine:8290]    Intake/Output

## 2023-11-09 NOTE — FLOWSHEET NOTE
11/09/23 1000   Vitals   Pulse 52   Heart Rate Source Monitor   Respirations 18   /80   MAP (Calculated) 97   MAP (mmHg) 92   BP Location Right lower arm   BP Upper/Lower Lower   BP Method Automatic   Patient Position Lying left side   Pain Assessment   Pain Assessment None - Denies Pain   Oxygen Therapy   SpO2 95 %   O2 Device None (Room air)     Patient awake and alert,aware in the hospital.Told him mom called to check on him and would come visit when he wanted her to. He asked how long he had been here,told him 1 week,he thought he'd only been here 2 days. DC arreaga catheter with no issues and pt able to void in urinal shortly there after. Patient resting now quietly denies needs.

## 2023-11-09 NOTE — PROGRESS NOTES
Speech-Language Pathology  Swallowing Disorders and Dysphagia     SLP Attempt and Discharge Note           Name: Brenda Rincon  : 2000  Medical Diagnosis: Delirium [R41.0]  Suicide attempt ([address]) Jenny Ayala  Acute encephalopathy [G93.40]  Non-traumatic rhabdomyolysis [M62.82]  Overdose of undetermined intent, initial encounter [T50.904A]  Drug overdose, intentional self-harm, sequela ([address]) [T50.902S]    Attempting to see pt for swallow eval. Pt was placed on PO diet without swallow evaluation by MD or nursing. Report he is tolerating diet and swallow eval is not needed. SLP to sign off. No charges filed. Suggest immediate consult should s/s of aspiration or dysphagia arise as pt is at high risk of aspiration, particularly silent aspiration, given prolonged intubation and constant use of strong sedatives until less than 24 hrs ago.  Thank you,    Hermes Moffett M.A., [facility] #[id number]  Speech-Language Pathologist  [facility] Phone (inpatient): [phone]  Speech Desk (outpatient)- [phone]    Certified to provide:  FEES, MBS, Vital Stim, and [facility] (MDTP)

## 2023-11-09 NOTE — FLOWSHEET NOTE
11/09/23 0800   Vitals   Pulse 55   Heart Rate Source Monitor   Respirations 20   /85   MAP (Calculated) 98   MAP (mmHg) 97   BP Location Right lower arm   BP Method Automatic   Patient Position Supine   Pain Assessment   Pain Assessment None - Denies Pain   RASS   [place] Agitation Sedation Scale (RASS) 0   Oxygen Therapy   SpO2 95 %   O2 Device None (Room air)     Received report on pt,resting quietly in bed,Dr Haydee Eric to see pt,pt appropriate. 1:1  provided as well

## 2023-11-09 NOTE — PROGRESS NOTES
Pt left  with [facility] employee and security. Written and verbal report given  Allowed pt to call mom on phone an talk to prior to dc

## 2023-11-09 NOTE — PLAN OF CARE
Problem: Discharge Planning  Goal: Discharge to home or other facility with appropriate resources  11/9/2023 1414 by Nasir Hooker RN  Outcome: Adequate for Discharge  11/9/2023 1229 by Nasir Hooker, RN  Outcome: Adequate for Discharge  11/9/2023 0833 by Nasir Hooker, RN  Outcome: Progressing     Problem: Risk for Elopement  Goal: Patient will not exit the unit/facility without proper excort  11/9/2023 1414 by Nasir Hooker, RN  Outcome: Adequate for Discharge  11/9/2023 1229 by Nasir Hooker, RN  Outcome: Adequate for Discharge  11/9/2023 0833 by Nasir Hooker, RN  Outcome: Progressing     Problem: Safety - Medical Restraint  Goal: Remains free of injury from restraints (Restraint for Interference with Medical Device)  Description: INTERVENTIONS:  1. Determine that other, less restrictive measures have been tried or would not be effective before applying the restraint  2. Evaluate the patient's condition at the time of restraint application  3. Inform patient/family regarding the reason for restraint  4.  Q2H: Monitor safety, psychosocial status, comfort, nutrition and hydration  11/9/2023 1414 by Nasir Hooker, RN  Outcome: Adequate for Discharge  11/9/2023 1229 by Nasir Hooker, RN  Outcome: Adequate for Discharge  11/9/2023 0833 by Nasir Hooker RN  Outcome: Progressing     Problem: Safety - Adult  Goal: Free from fall injury  11/9/2023 1414 by Nasir Hooker, RN  Outcome: Adequate for Discharge  11/9/2023 1229 by Nasir Hooker, RN  Outcome: Adequate for Discharge  11/9/2023 0833 by Nasir Hooker, RN  Outcome: Progressing     Problem: Pain  Goal: Verbalizes/displays adequate comfort level or baseline comfort level  11/9/2023 1414 by Nasir Hooker, RN  Outcome: Adequate for Discharge  11/9/2023 1229 by Nasir Hooker, RN  Outcome: Adequate for Discharge  11/9/2023 0833 by Nasir Hooker, RN  Outcome: Progressing     Problem: Nutrition Deficit:  Goal:

## 2023-11-10 PROBLEM — F12.20 CANNABIS USE DISORDER, SEVERE, DEPENDENCE (HCC): Status: ACTIVE | Noted: 2023-11-10

## 2023-11-10 LAB
ALBUMIN SERPL-MCNC: 3.9 G/DL (ref 3.4–5)
ALBUMIN/GLOB SERPL: 1.6 {RATIO} (ref 1.1–2.2)
ALP SERPL-CCNC: 62 U/L (ref 40–129)
ALT SERPL-CCNC: 83 U/L (ref 10–40)
ANION GAP SERPL CALCULATED.3IONS-SCNC: 10 MMOL/L (ref 3–16)
AST SERPL-CCNC: 57 U/L (ref 15–37)
BACTERIA SPEC RESP CULT: ABNORMAL
BILIRUB SERPL-MCNC: 0.6 MG/DL (ref 0–1)
BUN SERPL-MCNC: 12 MG/DL (ref 7–20)
CALCIUM SERPL-MCNC: 9.6 MG/DL (ref 8.3–10.6)
CHLORIDE SERPL-SCNC: 98 MMOL/L (ref 99–110)
CK SERPL-CCNC: 324 U/L (ref 39–308)
CO2 SERPL-SCNC: 28 MMOL/L (ref 21–32)
CREAT SERPL-MCNC: <0.5 MG/DL (ref 0.9–1.3)
GFR SERPLBLD CREATININE-BSD FMLA CKD-EPI: >60 ML/MIN/{1.73_M2}
GLUCOSE SERPL-MCNC: 104 MG/DL (ref 70–99)
GRAM STN SPEC: ABNORMAL
LAMOTRIGINE SERPL-MCNC: 12.8 UG/ML (ref 3–15)
ORGANISM: ABNORMAL
ORGANISM: ABNORMAL
POTASSIUM SERPL-SCNC: 3.8 MMOL/L (ref 3.5–5.1)
PROT SERPL-MCNC: 6.3 G/DL (ref 6.4–8.2)
SODIUM SERPL-SCNC: 136 MMOL/L (ref 136–145)

## 2023-11-10 PROCEDURE — 80053 COMPREHEN METABOLIC PANEL: CPT

## 2023-11-10 PROCEDURE — 82550 ASSAY OF CK (CPK): CPT

## 2023-11-10 PROCEDURE — 36415 COLL VENOUS BLD VENIPUNCTURE: CPT

## 2023-11-10 PROCEDURE — 80175 DRUG SCREEN QUAN LAMOTRIGINE: CPT

## 2023-11-10 PROCEDURE — 6370000000 HC RX 637 (ALT 250 FOR IP): Performed by: PSYCHIATRY & NEUROLOGY

## 2023-11-10 PROCEDURE — 99223 1ST HOSP IP/OBS HIGH 75: CPT | Performed by: PSYCHIATRY & NEUROLOGY

## 2023-11-10 PROCEDURE — 1240000000 HC EMOTIONAL WELLNESS R&B

## 2023-11-10 PROCEDURE — 6370000000 HC RX 637 (ALT 250 FOR IP)

## 2023-11-10 RX ORDER — ACETAMINOPHEN 325 MG/1
650 TABLET ORAL EVERY 8 HOURS PRN
Status: DISCONTINUED | OUTPATIENT
Start: 2023-11-10 | End: 2023-11-13 | Stop reason: HOSPADM

## 2023-11-10 RX ORDER — LAMOTRIGINE 100 MG/1
300 TABLET ORAL
Status: DISCONTINUED | OUTPATIENT
Start: 2023-11-10 | End: 2023-11-13

## 2023-11-10 RX ORDER — RISPERIDONE 0.25 MG/1
0.5 TABLET ORAL NIGHTLY
Status: DISCONTINUED | OUTPATIENT
Start: 2023-11-10 | End: 2023-11-13 | Stop reason: HOSPADM

## 2023-11-10 RX ADMIN — TRAZODONE HYDROCHLORIDE 50 MG: 50 TABLET ORAL at 21:39

## 2023-11-10 RX ADMIN — RISPERIDONE 0.5 MG: 0.25 TABLET, FILM COATED ORAL at 21:39

## 2023-11-10 RX ADMIN — AMOXICILLIN AND CLAVULANATE POTASSIUM 1 TABLET: 875; 125 TABLET, FILM COATED ORAL at 21:39

## 2023-11-10 RX ADMIN — AMOXICILLIN AND CLAVULANATE POTASSIUM 1 TABLET: 875; 125 TABLET, FILM COATED ORAL at 11:10

## 2023-11-10 ASSESSMENT — PATIENT HEALTH QUESTIONNAIRE - PHQ9
SUM OF ALL RESPONSES TO PHQ QUESTIONS 1-9: 0
1. LITTLE INTEREST OR PLEASURE IN DOING THINGS: 0
2. FEELING DOWN, DEPRESSED OR HOPELESS: 0
SUM OF ALL RESPONSES TO PHQ QUESTIONS 1-9: 0
SUM OF ALL RESPONSES TO PHQ9 QUESTIONS 1 & 2: 0

## 2023-11-10 ASSESSMENT — PAIN SCALES - GENERAL: PAINLEVEL_OUTOF10: 0

## 2023-11-10 ASSESSMENT — SLEEP AND FATIGUE QUESTIONNAIRES
DO YOU HAVE DIFFICULTY SLEEPING: NO
DO YOU USE A SLEEP AID: YES
AVERAGE NUMBER OF SLEEP HOURS: 10

## 2023-11-10 NOTE — PLAN OF CARE
Problem: Self Harm/Suicidality  Goal: Will have no self-injury during hospital stay  Description: INTERVENTIONS:  1. Ensure constant observer at bedside with Q15M safety checks  2. Maintain a safe environment  3. Secure patient belongings  4. Ensure family/visitors adhere to safety recommendations  5. Ensure safety tray has been added to patient's diet order  6. Every shift and PRN: Re-assess suicidal risk via Frequent Screener    Outcome: Progressing     Problem: Anxiety  Goal: Will report anxiety at manageable levels  Description: INTERVENTIONS:  1. Administer medication as ordered  2. Teach and rehearse alternative coping skills  3. Provide emotional support with 1:1 interaction with staff  Outcome: Progressing      11/10/23 1717   Mental Status and Behavioral Exam   Normal No   Level of Assistance Independent/Self   Facial Expression Flat   Affect Appropriate   Level of Consciousness Alert   Frequency of Checks 4 times per hour, close   Mood:Normal No   Mood Depressed   Motor Activity:Normal Yes   Eye Contact Good   Observed Behavior Cooperative   Sexual Misconduct History Current - no   Preception [place] to person;[place] to time;[place] to situation   Attention:Normal Yes   Thought Content:Normal Yes   Depression Symptoms Change in energy level; Impaired concentration   Anxiety Symptoms Generalized   Mariana Symptoms No problems reported or observed.    Hallucinations None   Delusions No   Memory:Normal No   Memory Poor recent   Insight and Judgment No   Insight and Judgment Poor insight

## 2023-11-10 NOTE — H&P
Patient has been seen and evaluated within 30 days by IM provider and medically cleared for admission to [facility]. Please refer to medical H&P from 11/3/23 and discharge summary / progress note from 11/9/23. #Intentional drug overdose   # Suicidal intent   # Acute toxic Encephalopathy  -overdosed on  lamictal   -Drug screen - cocaine and MJ  -poison control was contacted,recommended observation but pt got restless and agitated needing several doses of IV meds for  agitation   -Subsequently intubated  -suicide precautions ; needs sitter at bedside once extubated.  -psychiatry consulted - Plan to go to [facility] once medically stable   Extubated. Now on 3 L of O2  Ativan IV prn  Precedex drip- -wean and dc   Haldol prn  Continue IVF   Neuro and psych following   Precedex d/c ed. Doing much better today  D/W psych  Ok to transfer t [facility]     # Acute resp failure    # Apneic spells with hypercarbia - likely with sedative meds and toxic encephalopathy  - intubated, now placed on vent   - sedation with fentanyl gtt, propofol gtt and prn versed  - imaging neg for acute findings on xray  - supportive care   - weaning in 24 hrs when appropriate  Decadron   Extubated  Dc steroids   On RA       # Fevers  # Likely asp PNA  - CXR showed new infiltrates   Staph aureus and step pneumo  in culture   -  continue unasyn      #Cocaine use   -recommend cessation      #Fall out of bed  -CT head negative      #Elevated CK   Non traumatic rhabdomyolysis  - sec to agitation   -received multiple IVF boluses   Declining   Dc IVF     Vitals reviewed and stable. Labs reviewed and nothing to follow. Orders reviewed. Please contact with IM provider with concerns.     Fan Díaz PA-C  11/10/2023 1:16 PM

## 2023-11-10 NOTE — PLAN OF CARE
Problem: Self Harm/Suicidality  Goal: Will have no self-injury during hospital stay  Description: INTERVENTIONS:  1. Ensure constant observer at bedside with Q15M safety checks  2. Maintain a safe environment  3. Secure patient belongings  4. Ensure family/visitors adhere to safety recommendations  5. Ensure safety tray has been added to patient's diet order  6. Every shift and PRN: Re-assess suicidal risk via Frequent Screener    Outcome: Progressing     Problem: Risk for Elopement  Goal: Patient will not exit the unit/facility without proper excort  Outcome: Progressing     Problem: Anxiety  Goal: Will report anxiety at manageable levels  Description: INTERVENTIONS:  1. Administer medication as ordered  2. Teach and rehearse alternative coping skills  3. Provide emotional support with 1:1 interaction with staff  Outcome: Progressing     Problem: Behavior  Goal: Pt/Family maintain appropriate behavior and adhere to behavioral management agreement, if implemented  Description: INTERVENTIONS:  1. Assess patient/family's coping skills and  non-compliant behavior (including use of illegal substances)  2. Notify security of behavior or suspected illegal substances which indicate the need for search of the family and/or belongings  3. Encourage verbalization of thoughts and concerns in a socially appropriate manner  4. Utilize positive, consistent limit setting strategies supporting safety of patient, staff and others  5. Encourage participation in the decision making process about the behavioral management agreement  6. If a visitor's behavior poses a threat to safety call refer to organization policy. 7. Initiate consult with , Psychosocial CNS, Spiritual Care as appropriate  Outcome: Bessie Lugo has been withdrawn and isolated to room. He denied SI/HI,A/V hallucinations. He is hoping to be discharged from the hospital on Friday. He isn't sure if he will be permitted to go home.  He denied

## 2023-11-10 NOTE — CARE COORDINATION
SW met w/Pt 1:1 to complete psychosocial assessment, lifetime CSSR-S, and OQ Analyst. Pt was friendly and cooperative in answering the questions. Pt denied SI but was admitted for a suicide attempt by OD. The Pt identified that they were not having SI, they were in a stressful situation and attempted to OD out of impulse without any thought. 11/10/23 1308   Psychiatric History   Psychiatric history treatment   (N/A)   Are there any medication issues? No   Recent Psychological Experiences Turmoil (comment)  (Pt reports being admitted due to a sudden and impulsive suicide attempt. Pt attempted to OD using their mood stabilizer meds. Pt reports having his son 1 year ago which has challenging and stressful.)   Support System   Support system Adequate   Types of Support System Mother; Other (Comment)  (Girlfriend)   Problems in support system Other (Comment)  (Pr reports that his mother and girlfriend are his only real supports.)   Current Living Situation   Home Living Adequate   Living information Lives with others  (Pt lives w/his girlfriend and son)   Problems with living situation  No   Lack of basic needs No   SSDI/SSI N/A   Other government assistance N/A   Problems with environment N/A   Current abuse issues Denies   Supervised setting None   Relationship problems No   Medical and Self-Care Issues   Relevant medical problems Irregular Heart Beat   Relevant self-care issues N/A   Barriers to treatment No   Family [place]   Spouse/partner-name/age Deana   Children-names/ages Kaizer   Parents [facility]   Siblings 2 younger brothers   Support services   (N/A)   Childhood   Raised by Biological mother; Adoptive parent(s)   Biological mother Lamonte Mouse   Adoptive parents Christopher Medicus   Relevant family history ADHD, Bi-Polar   History of abuse No   Legal History   Legal history No   Juvenile legal history Yes   Juvenile legal history   Violent behavior  Others  (Fighting w/the police and stepfather)

## 2023-11-10 NOTE — PROGRESS NOTES
1:1 Assessment completed. Patient is alert and oriented x 4. Uses direct eye contact and is dressed appropriately in hospital clothing. Patient denied SI/HI,A/V hallucinations. The patient stated his  mood was:\"Good\". Patient didn't attend groups this shift. The patient does understand why they are here, but denied that he attempted suicide. Patient is hoping to be discharged from the hospital on Fr[place]aty. Patient is medication compliant. Judgement,insight and impulse control are limited. Patient denied any physical complaints this evening. Vital signs are noted. Safety checks continue Q 15 minutes throughout the shift. PRNS this shift? Trazodone = not effective. .  Patient slept 5.75 hours this shift. Quality 226: Preventive Care And Screening: Tobacco Use: Screening And Cessation Intervention: Patient screened for tobacco use and is an ex/non-smoker Quality 110: Preventive Care And Screening: Influenza Immunization: Influenza Immunization not Administered for Documented Reasons. Detail Level: Zone Quality 130: Documentation Of Current Medications In The Medical Record: Current Medications Documented Quality 431: Preventive Care And Screening: Unhealthy Alcohol Use - Screening: Patient screened for unhealthy alcohol use using a single question and scores less than 2 times per year Additional Notes: Patient has not yet received her flu vaccination. Patient states that she normally gets her vaccine from her job

## 2023-11-10 NOTE — H&P
[address], [address]                              HISTORY AND PHYSICAL    PATIENT NAME: Sean Tejeda                   :        2000  MED REC NO:   [id number]                          ROOM:       2319  ACCOUNT NO:   [de-identified]                           ADMIT DATE: 2023  PROVIDER:     Aries Washburn MD    IDENTIFICATION:  This is a domiciled, never , and newly-employed  22-year-old with a history of bipolar disorder who was brought to our  emergency department on the 2nd after overdosing on 20 to 40 tablets of  150 mg Lamictal.  He required admission to the ICU and was intubated. He was extubated three days ago and transferred out of the ICU  yesterday. SOI: patient. Fiance. Focused record review. CC: \"I had an episode. \"    HPI  The patient's girlfriend told me they had broken up and afterward he  told her, \"If I can't live with you, then what is the point in living. \"   She believes he took 40, 150 mg tablets of Lamictal.  She became  concerned so he was brought to the emergency department. Today the patient tells me that the last thing he remembers is taking  the overdose impulsively. He says he was having \"episode\" because they were  Fighting. He says he immediately regretted it  and tried to \"vomit them up. \"  He says he does not remember anything  after that until waking up in the ICU. He says he needs to discharge today to return to work so he can support his  girlfriend and their son. He says \"I'm a happy-go-alonzo person, I'm not  depressed. \"  He has not spoken with his girlfriend since being admitted. He shows very little insight into the significance and potential  lethality of the overdose. He does not present manic or psychotic. He  reports feeling safe here. He says he was not thinking of suicide  before that moment and has not thought about suicide since.     He

## 2023-11-10 NOTE — PROGRESS NOTES
Behavioral Services  Medicare Certification Upon Admission    I certify that this patient's inpatient psychiatric hospital admission is medically necessary for:    [x] (1) Treatment which could reasonably be expected to improve this patient's condition,       [x] (2) Or for diagnostic study;     AND     [x](2) The inpatient psychiatric services are provided while the individual is under the care of a physician and are included in the individualized plan of care.     Estimated length of stay/service 2-3 days    Plan for post-hospital care incomplete     Electronically signed by Bin Gilmore MD on 11/10/2023 at 12:38 PM

## 2023-11-11 PROCEDURE — 1240000000 HC EMOTIONAL WELLNESS R&B

## 2023-11-11 PROCEDURE — 6370000000 HC RX 637 (ALT 250 FOR IP)

## 2023-11-11 PROCEDURE — 6370000000 HC RX 637 (ALT 250 FOR IP): Performed by: PSYCHIATRY & NEUROLOGY

## 2023-11-11 RX ADMIN — TRAZODONE HYDROCHLORIDE 50 MG: 50 TABLET ORAL at 21:37

## 2023-11-11 RX ADMIN — RISPERIDONE 0.5 MG: 0.25 TABLET, FILM COATED ORAL at 21:36

## 2023-11-11 RX ADMIN — AMOXICILLIN AND CLAVULANATE POTASSIUM 1 TABLET: 875; 125 TABLET, FILM COATED ORAL at 15:12

## 2023-11-11 RX ADMIN — AMOXICILLIN AND CLAVULANATE POTASSIUM 1 TABLET: 875; 125 TABLET, FILM COATED ORAL at 21:35

## 2023-11-11 NOTE — PLAN OF CARE
Problem: Self Harm/Suicidality  Goal: Will have no self-injury during hospital stay  Description: INTERVENTIONS:  1. Ensure constant observer at bedside with Q15M safety checks  2. Maintain a safe environment  3. Secure patient belongings  4. Ensure family/visitors adhere to safety recommendations  5. Ensure safety tray has been added to patient's diet order  6. Every shift and PRN: Re-assess suicidal risk via Frequent Screener    11/11/2023 1328 by Manpreet Chi RN  Outcome: Progressing     Patient remains free from self harm behaviors. Denies suicidal ideation. Guarded in interaction and doesn't engage much beyond a superficial level. States he was told he would be discharged on Monday which is his birthday, and that he was looking forward to this.

## 2023-11-11 NOTE — PROGRESS NOTES
1:1 Assessment completed. Patient is alert and oriented x 4. Uses direct eye contact and is dressed appropriately in hospital clothing. Patient denied SI/HI,A/V hallucinations. The patient stated his  mood was: \"Ok\". Patient didn't attend groups this shift. The patient does understand why they are here, but denied that he attempted suicide. Patient is hoping to be discharged from the hospital on Monday. Patient is medication compliant. Judgement,insight and impulse control are limited. Patient denied any physical complaints this evening. Vital signs are noted. Safety checks continue Q 15 minutes throughout the shift. PRNS this shift? Trazodone = not effective. .  Patient slept 7.25 hours this shift.

## 2023-11-11 NOTE — PLAN OF CARE
Problem: Self Harm/Suicidality  Goal: Will have no self-injury during hospital stay  Description: INTERVENTIONS:  1. Ensure constant observer at bedside with Q15M safety checks  2. Maintain a safe environment  3. Secure patient belongings  4. Ensure family/visitors adhere to safety recommendations  5. Ensure safety tray has been added to patient's diet order  6. Every shift and PRN: Re-assess suicidal risk via Frequent Screener    11/11/2023 0054 by Mehdi Fair RN  Outcome: Progressing     Problem: Risk for Elopement  Goal: Patient will not exit the unit/facility without proper excort  Outcome: Progressing     Problem: Anxiety  Goal: Will report anxiety at manageable levels  Description: INTERVENTIONS:  1. Administer medication as ordered  2. Teach and rehearse alternative coping skills  3. Provide emotional support with 1:1 interaction with staff  11/11/2023 0054 by Mehdi Fair RN  Outcome: Progressing     Problem: Behavior  Goal: Pt/Family maintain appropriate behavior and adhere to behavioral management agreement, if implemented  Description: INTERVENTIONS:  1. Assess patient/family's coping skills and  non-compliant behavior (including use of illegal substances)  2. Notify security of behavior or suspected illegal substances which indicate the need for search of the family and/or belongings  3. Encourage verbalization of thoughts and concerns in a socially appropriate manner  4. Utilize positive, consistent limit setting strategies supporting safety of patient, staff and others  5. Encourage participation in the decision making process about the behavioral management agreement  6. If a visitor's behavior poses a threat to safety call refer to organization policy. 7. Initiate consult with , Psychosocial CNS, Spiritual Care as appropriate  Outcome: Terence Smith has been cooperative and pleasant with interaction. He denied SI/HI,A/V hallucinations.  He said that he would come to

## 2023-11-11 NOTE — PROGRESS NOTES
Patient was noted to be in bed, quietly resting with both eyes closed. Medication is noted to be effective.

## 2023-11-12 LAB — LAMOTRIGINE SERPL-MCNC: 3.4 UG/ML (ref 3–15)

## 2023-11-12 PROCEDURE — 99232 SBSQ HOSP IP/OBS MODERATE 35: CPT | Performed by: NURSE PRACTITIONER

## 2023-11-12 PROCEDURE — 6370000000 HC RX 637 (ALT 250 FOR IP): Performed by: PSYCHIATRY & NEUROLOGY

## 2023-11-12 PROCEDURE — 1240000000 HC EMOTIONAL WELLNESS R&B

## 2023-11-12 PROCEDURE — 6370000000 HC RX 637 (ALT 250 FOR IP)

## 2023-11-12 RX ADMIN — RISPERIDONE 0.5 MG: 0.25 TABLET, FILM COATED ORAL at 20:11

## 2023-11-12 RX ADMIN — TRAZODONE HYDROCHLORIDE 50 MG: 50 TABLET ORAL at 20:13

## 2023-11-12 NOTE — PLAN OF CARE
Patient attended evening group and interacted well with peers and staff. He reports to this writer that he is \"embarrassed\" about everything that lead up to this hospitalization. He denies SI/HI/AH/VH, currently. Patient verbalizes positive support system from family and future plans. Problem: Self Harm/Suicidality  Goal: Will have no self-injury during hospital stay  Description: INTERVENTIONS:  1. Ensure constant observer at bedside with Q15M safety checks  2. Maintain a safe environment  3. Secure patient belongings  4. Ensure family/visitors adhere to safety recommendations  5. Ensure safety tray has been added to patient's diet order  6.   Every shift and PRN: Re-assess suicidal risk via Frequent Screener    11/11/2023 2343 by Wil Velarde RN  Outcome: Progressing  11/11/2023 1328 by Matt Alonso RN  Outcome: Progressing     Problem: Risk for Elopement  Goal: Patient will not exit the unit/facility without proper excort  Outcome: Progressing

## 2023-11-12 NOTE — PLAN OF CARE
Problem: Self Harm/Suicidality  Goal: Will have no self-injury during hospital stay  Description: INTERVENTIONS:  1. Ensure constant observer at bedside with Q15M safety checks  2. Maintain a safe environment  3. Secure patient belongings  4. Ensure family/visitors adhere to safety recommendations  5. Ensure safety tray has been added to patient's diet order  6. Every shift and PRN: Re-assess suicidal risk via Frequent Screener    11/12/2023 1158 by Josesito Peterson LPN  Outcome: Progressing     Problem: Anxiety  Goal: Will report anxiety at manageable levels  Description: INTERVENTIONS:  1. Administer medication as ordered  2. Teach and rehearse alternative coping skills  3. Provide emotional support with 1:1 interaction with staff  Outcome: Progressing      11/12/23 1148   Mental Status and Behavioral Exam   Normal Yes   Level of Assistance Independent/Self   Facial Expression Brightened   Affect Appropriate   Level of Consciousness Alert   Frequency of Checks 4 times per hour, close   Mood:Normal No   Mood Other (comment); Ashamed/humiliated   Motor Activity:Normal Yes   Motor Activity Decreased   Eye Contact Good   Observed Behavior Cooperative   Sexual Misconduct History Current - no   Preception [place] to person;[place] to time;[place] to place;[place] to situation   Attention:Normal Yes   Thought Processes Unremarkable   Thought Content:Normal Yes   Thought Content Other (comment)   Depression Symptoms No problems reported or observed. Anxiety Symptoms Generalized   Mariana Symptoms No problems reported or observed. Hallucinations None   Delusions No   Memory:Normal Yes   Memory Poor recent   Insight and Judgment No   Insight and Judgment Poor judgment;Poor insight     Lakesha Gonzalez has been withdrawn to room and self most of the shift. Patient denies SI/HI and AVH. Patient expresses he is feeling better and is looking forward to discharge and celebrating his 27st birthday with his family.

## 2023-11-12 NOTE — PROGRESS NOTES
Group Therapy Note    Date: 11/11/2023  Start Time: 20:00  End Time:  21:00  Number of Participants: 14    Type of Group: Recreational  wrap up    Wellness Binder Information  Module Name:  /  Session Number:  /    Patient's Goal:  coping skills    Notes:  continuing to work on goal    Status After Intervention:  Unchanged    Participation Level:  Active Listener and Interactive    Participation Quality: Appropriate, Attentive, and Sharing      Speech:  normal      Thought Process/Content: Logical      Affective Functioning: Blunted      Mood: anxious      Level of consciousness:  Alert and Attentive      Response to Learning: Able to change behavior      Endings: None Reported    Modes of Intervention: Socialization and Problem-solving      Discipline Responsible: Behavorial Health Tech      Signature:  Houston Hirsch

## 2023-11-13 VITALS
WEIGHT: 107.36 LBS | HEART RATE: 102 BPM | RESPIRATION RATE: 16 BRPM | TEMPERATURE: 96.9 F | HEIGHT: 70 IN | OXYGEN SATURATION: 98 % | SYSTOLIC BLOOD PRESSURE: 137 MMHG | BODY MASS INDEX: 15.37 KG/M2 | DIASTOLIC BLOOD PRESSURE: 93 MMHG

## 2023-11-13 PROCEDURE — 5130000000 HC BRIDGE APPOINTMENT

## 2023-11-13 RX ORDER — RISPERIDONE 0.5 MG/1
0.5 TABLET ORAL NIGHTLY
Qty: 30 TABLET | Refills: 0 | Status: SHIPPED | OUTPATIENT
Start: 2023-11-13 | End: 2023-12-13

## 2023-11-13 RX ORDER — LAMOTRIGINE 150 MG/1
300 TABLET ORAL
Qty: 60 TABLET | Refills: 0 | Status: SHIPPED | OUTPATIENT
Start: 2023-11-13 | End: 2023-12-13

## 2023-11-13 RX ORDER — LAMOTRIGINE 100 MG/1
300 TABLET ORAL
Status: DISCONTINUED | OUTPATIENT
Start: 2023-11-13 | End: 2023-11-13 | Stop reason: HOSPADM

## 2023-11-13 NOTE — BH NOTE
Ebb Batch will be discharged today per Psychiatrist order. States mood is improved although remains ambivalent. Remains somewhat guarded and has little insight into his recent suicide attempt by ingestion. States he is looking forward to returning home with his girlfriend and his son. States he has spoke to his girlfriend and that they are on good terms. Denies SI. Declines needs at this time. Discussed AVS and safety plan with patient.

## 2023-11-13 NOTE — PLAN OF CARE
Problem: Self Harm/Suicidality  Goal: Will have no self-injury during hospital stay  Description: INTERVENTIONS:  1. Ensure constant observer at bedside with Q15M safety checks  2. Maintain a safe environment  3. Secure patient belongings  4. Ensure family/visitors adhere to safety recommendations  5. Ensure safety tray has been added to patient's diet order  6. Every shift and PRN: Re-assess suicidal risk via Frequent Screener    11/13/2023 1122 by Gregorio Friedman RN  Outcome: Adequate for Discharge     Problem: Risk for Elopement  Goal: Patient will not exit the unit/facility without proper excort  Outcome: Adequate for Discharge     Problem: Anxiety  Goal: Will report anxiety at manageable levels  Description: INTERVENTIONS:  1. Administer medication as ordered  2. Teach and rehearse alternative coping skills  3. Provide emotional support with 1:1 interaction with staff  11/13/2023 1122 by Gregorio Friedman RN  Outcome: Adequate for Discharge     Problem: Behavior  Goal: Pt/Family maintain appropriate behavior and adhere to behavioral management agreement, if implemented  Description: INTERVENTIONS:  1. Assess patient/family's coping skills and  non-compliant behavior (including use of illegal substances)  2. Notify security of behavior or suspected illegal substances which indicate the need for search of the family and/or belongings  3. Encourage verbalization of thoughts and concerns in a socially appropriate manner  4. Utilize positive, consistent limit setting strategies supporting safety of patient, staff and others  5. Encourage participation in the decision making process about the behavioral management agreement  6. If a visitor's behavior poses a threat to safety call refer to organization policy.   7. Initiate consult with , Psychosocial CNS, Spiritual Care as appropriate  Outcome: Adequate for Discharge

## 2023-11-13 NOTE — DISCHARGE INSTRUCTIONS
Advanced Directives:  Patient does not have a surrogate decision maker appointed   Name (if yes): N/A Phone Number: N/A  Patient does not have a psychiatric and/ or medical advanced directive or power of . Patient was offered psychiatric and/ or medical advanced directive or power of  information/completion but declined to complete   Why not? N/A    Discharge Planning is Complete. Discharge Date: 11/13/23   Reason for Hospitalization: This is a domiciled, never , and newly-employed 82-year-old with a history of bipolar disorder who was brought to our  emergency department on the 11/02/2023 after overdosing on 20 to 40 tablets of 150 mg Lamictal.  He required admission to the ICU and was intubated. He was extubated and transferred out of the ICU to [facility] on 11/9/2023. Discharge Diagnosis: Depression, unspecified  Discharging to: Home    Your instructions: Your physician here was Della Fernandez MD. If you have any questions please call the unit at [phone] for the adult unit or [phone] for [facility]. Please note that we have a patient family advisory [place] that meets the second Wednesday of January and the second Wednesday of July at [address] [place] [address] in [place] at [facility]. Department leadership would love for you to attend to give feedback on what we are doing well and areas in which we can improve our patient care. Please come if you are able and feel free to reach out to [address] [place] [address] at [phone] with any questions. The crisis number for [facility] is [phone] ([facility]). This crisis line is available 24 hours a day, seven days a week. Please follow up with your PCP regarding any pending labs.      Your next appointment is:  Name of Provider: KALEY Ramirez   Provider specialty/license: KALEY   Date and time of appointment: THIS IS A VIRTUAL APPOINTMENT. 11/20/2023 @ 3 pm  The type/s of services requested are: Mental

## 2023-11-13 NOTE — CARE COORDINATION
[facility]  Discharge Note    Pt discharged with followings belongings:   Dental Appliances: None  Vision - Corrective Lenses: None  Hearing Aid: None  Jewelry: None  Body Piercings Removed: No  Clothing: Undergarments (4 pairs of underwear, in patient's locker)  Other Valuables: Other (Comment) (2 vapes)   Valuables sent home with patient or returned to patient. Patient educated on aftercare instructions: yes. Information faxed to [address] [place] [address] by this writer  at 12:23 PM .Patient verbalize understanding of AVS:  yes. Status EXAM upon discharge:  Mental Status and Behavioral Exam  Normal: Yes  Level of Assistance: Independent/Self  Facial Expression: Brightened  Affect: Congruent, Appropriate  Level of Consciousness: Alert  Frequency of Checks: 4 times per hour, close  Mood:Normal: Yes  Mood: Anxious, Ambivalent (anxious regarding discharge, appropriate for situation)  Motor Activity:Normal: Yes  Motor Activity: Decreased  Eye Contact: Good  Observed Behavior: Cooperative  Sexual Misconduct History: Current - no  Preception: [place] to person, [place] to time, [place] to place, [place] to situation  Attention:Normal: Yes  Thought Processes: Unremarkable  Thought Content:Normal: Yes  Thought Content: Other (comment)  Depression Symptoms: No problems reported or observed. Anxiety Symptoms: Generalized  Mariana Symptoms: No problems reported or observed.   Hallucinations: None  Delusions: No  Memory:Normal: Yes  Memory: Poor recent  Insight and Judgment: No  Insight and Judgment: Poor judgment, Poor insight    Tobacco Screening:  Practical Counseling, on admission, abimbola X, if applicable and completed (first 3 are required if patient doesn't refuse):            (X) Recognizing danger situations (included triggers and roadblocks)                    (X) Coping skills (new ways to manage stress,relaxation techniques, changing routine, distraction)

## 2023-11-13 NOTE — PLAN OF CARE
Maryellen Essex has been out in the milieu and social with select peers this shift. Patient reports he is hopeful for discharge in the morning. Patient medication compliant and reports anxiety has improved. Maryellen Essex denies current SI/HI&A/V/H. Problem: Self Harm/Suicidality  Goal: Will have no self-injury during hospital stay  Description: INTERVENTIONS:  1. Ensure constant observer at bedside with Q15M safety checks  2. Maintain a safe environment  3. Secure patient belongings  4. Ensure family/visitors adhere to safety recommendations  5. Ensure safety tray has been added to patient's diet order  6. Every shift and PRN: Re-assess suicidal risk via Frequent Screener    11/12/2023 2335 by Claudia Morin RN  Outcome: Progressing     Problem: Anxiety  Goal: Will report anxiety at manageable levels  Description: INTERVENTIONS:  1. Administer medication as ordered  2. Teach and rehearse alternative coping skills  3.  Provide emotional support with 1:1 interaction with staff  11/12/2023 2335 by Claudia Morin RN  Outcome: Progressing

## 2023-11-13 NOTE — BH NOTE
Patient refusing Lamictal. States he will take it this evening and that he usually takes it at night.

## 2023-11-13 NOTE — PROGRESS NOTES
Patient was out  to the team station & asked when his doctor would be here. \"I might need to leave tomorrow, because of an emergency. \" Patient declined to verbalize further. Patient returned to his room.  Marbella Canales R.N

## 2023-11-14 ENCOUNTER — FOLLOWUP TELEPHONE ENCOUNTER (OUTPATIENT)
Dept: PSYCHIATRY | Age: 23
End: 2023-11-14

## 2023-11-15 ENCOUNTER — FOLLOWUP TELEPHONE ENCOUNTER (OUTPATIENT)
Dept: PSYCHIATRY | Age: 23
End: 2023-11-15

## 2023-11-16 ENCOUNTER — FOLLOWUP TELEPHONE ENCOUNTER (OUTPATIENT)
Dept: PSYCHIATRY | Age: 23
End: 2023-11-16